# Patient Record
Sex: FEMALE | Race: BLACK OR AFRICAN AMERICAN | NOT HISPANIC OR LATINO | URBAN - METROPOLITAN AREA
[De-identification: names, ages, dates, MRNs, and addresses within clinical notes are randomized per-mention and may not be internally consistent; named-entity substitution may affect disease eponyms.]

---

## 2022-01-25 ENCOUNTER — EMERGENCY (EMERGENCY)
Facility: HOSPITAL | Age: 22
LOS: 1 days | Discharge: HOME | End: 2022-01-25
Admitting: STUDENT IN AN ORGANIZED HEALTH CARE EDUCATION/TRAINING PROGRAM
Payer: COMMERCIAL

## 2022-01-25 VITALS
OXYGEN SATURATION: 98 % | RESPIRATION RATE: 18 BRPM | TEMPERATURE: 97 F | SYSTOLIC BLOOD PRESSURE: 130 MMHG | HEART RATE: 80 BPM | DIASTOLIC BLOOD PRESSURE: 70 MMHG

## 2022-01-25 VITALS
SYSTOLIC BLOOD PRESSURE: 126 MMHG | RESPIRATION RATE: 18 BRPM | HEART RATE: 78 BPM | TEMPERATURE: 97 F | DIASTOLIC BLOOD PRESSURE: 74 MMHG | OXYGEN SATURATION: 100 %

## 2022-01-25 PROCEDURE — 99283 EMERGENCY DEPT VISIT LOW MDM: CPT

## 2022-01-25 RX ORDER — CYCLOBENZAPRINE HYDROCHLORIDE 10 MG/1
1 TABLET, FILM COATED ORAL
Qty: 10 | Refills: 0
Start: 2022-01-25 | End: 2022-01-29

## 2022-01-25 RX ORDER — IBUPROFEN 200 MG
600 TABLET ORAL ONCE
Refills: 0 | Status: COMPLETED | OUTPATIENT
Start: 2022-01-25 | End: 2022-01-25

## 2022-01-25 RX ORDER — LIDOCAINE 4 G/100G
1 CREAM TOPICAL ONCE
Refills: 0 | Status: COMPLETED | OUTPATIENT
Start: 2022-01-25 | End: 2022-01-25

## 2022-01-25 RX ORDER — IBUPROFEN 200 MG
1 TABLET ORAL
Qty: 15 | Refills: 0
Start: 2022-01-25 | End: 2022-01-29

## 2022-01-25 RX ORDER — DIAZEPAM 5 MG
5 TABLET ORAL ONCE
Refills: 0 | Status: DISCONTINUED | OUTPATIENT
Start: 2022-01-25 | End: 2022-01-25

## 2022-01-25 RX ADMIN — LIDOCAINE 1 PATCH: 4 CREAM TOPICAL at 20:27

## 2022-01-25 RX ADMIN — Medication 600 MILLIGRAM(S): at 20:41

## 2022-01-25 RX ADMIN — Medication 5 MILLIGRAM(S): at 20:27

## 2022-01-25 NOTE — ED ADULT TRIAGE NOTE - CHIEF COMPLAINT QUOTE
Pt c/o left side neck, left shoulder and upper back pain since yesterday. Pt denies injury or trauma, no PMH

## 2022-01-25 NOTE — ED PROVIDER NOTE - OBJECTIVE STATEMENT
22 y/o F p/w L sided neck pain x today. Pt states neck feels stiff, worse with movement or palpation. Pain radiates into L shoulder and mid back. Has not taken anything for pain. No chest pain or sob. No fever, chills.

## 2022-01-25 NOTE — ED ADULT NURSE NOTE - OBJECTIVE STATEMENT
Pt received to INT room 4. Pt A&OX3, ambulatory at baseline. Pt denies pmh. Pt c/o L sided neck pain, L sided shoulder pain, and L sided back pain 8/10, nonradiating. Pt denies any recent trauma, injuries, falls at this time. Pt endorses no recent physical activity that would have caused muscle strain. Pt resp even unlabored, abd soft nontender, pedal pulses 2+ bilaterally. Pt denies chest pain, SOB, n/v/d, headaches, dizziness, changes in vision. Medicated as per MD. Awaiting further orders.

## 2022-01-25 NOTE — ED PROVIDER NOTE - PATIENT PORTAL LINK FT
You can access the FollowMyHealth Patient Portal offered by [facility] by registering at the following website: [email or web address]. By joining [facility]’s FollowMyHealth portal, you will also be able to view your health information using other applications (apps) compatible with our system.

## 2022-01-25 NOTE — ED ADULT NURSE NOTE - NSIMPLEMENTINTERV_GEN_ALL_ED
Implemented All Fall with Harm Risk Interventions:  [place] to call system. Call bell, personal items and telephone within reach. Instruct patient to call for assistance. Room bathroom lighting operational. Non-slip footwear when patient is off stretcher. Physically safe environment: no spills, clutter or unnecessary equipment. Stretcher in lowest position, wheels locked, appropriate side rails in place. Provide visual cue, wrist band, yellow gown, etc. Monitor gait and stability. Monitor for mental status changes and reorient to person, place, and time. Review medications for side effects contributing to fall risk. Reinforce activity limits and safety measures with patient and family. Provide visual clues: red socks.

## 2022-12-18 ENCOUNTER — EMERGENCY (EMERGENCY)
Facility: HOSPITAL | Age: 22
LOS: 1 days | Discharge: HOME | End: 2022-12-18
Attending: STUDENT IN AN ORGANIZED HEALTH CARE EDUCATION/TRAINING PROGRAM | Admitting: STUDENT IN AN ORGANIZED HEALTH CARE EDUCATION/TRAINING PROGRAM

## 2022-12-18 VITALS
RESPIRATION RATE: 18 BRPM | DIASTOLIC BLOOD PRESSURE: 65 MMHG | SYSTOLIC BLOOD PRESSURE: 123 MMHG | OXYGEN SATURATION: 98 % | TEMPERATURE: 98 F | HEART RATE: 85 BPM

## 2022-12-18 PROCEDURE — 99284 EMERGENCY DEPT VISIT MOD MDM: CPT

## 2022-12-18 PROCEDURE — 93010 ELECTROCARDIOGRAM REPORT: CPT

## 2022-12-18 NOTE — ED PROVIDER NOTE - CLINICAL SUMMARY MEDICAL DECISION MAKING FREE TEXT BOX
Young F presenting after an episode of whole body shaking and chest pressure. Asymptomatic at this time. Afebrile, well appearing, physical exam benign. No neuro deficits. EKG non-ischemic. Will refer to PMD/psych. Not consistent with ACS.

## 2022-12-18 NOTE — ED PROVIDER NOTE - PATIENT PORTAL LINK FT
You can access the FollowMyHealth Patient Portal offered by [facility] by registering at the following website: [email or web address]. By joining [facility]’s FollowMyHealth portal, you will also be able to view your health information using other applications (apps) compatible with our system.

## 2022-12-18 NOTE — ED ADULT TRIAGE NOTE - CHIEF COMPLAINT QUOTE
p/t with hx anxiety c/o of generalized weakness on and off, occasional vomiting, chest discomfort since this am, p/t appears anxious, denies any chest pain denies sob

## 2022-12-18 NOTE — ED PROVIDER NOTE - OBJECTIVE STATEMENT
22F, no sig, pmh who presents with an episode of whole body shaking and chest pressure. Earlier this afternoon, had an episode of whole body shaking and chest pressure that self-resolved. Reports that she has a hx of these episodes. Now feels well. No headaches, fevers, chills, cough, sputum, belly pain, nvd, dysuria, hematuria, recent travel, trauma, syncope. Does report that she is feeling more anxious lately. Denies SI/HI. Does not see a therapist/psych. LMP 3 weeks prior.

## 2023-04-26 NOTE — ED ADULT TRIAGE NOTE - PRO INTERPRETER NEED 2

## 2024-03-24 ENCOUNTER — EMERGENCY (EMERGENCY)
Facility: HOSPITAL | Age: 24
LOS: 1 days | Discharge: HOME | End: 2024-03-24
Admitting: EMERGENCY MEDICINE
Payer: COMMERCIAL

## 2024-03-24 VITALS
RESPIRATION RATE: 16 BRPM | OXYGEN SATURATION: 99 % | DIASTOLIC BLOOD PRESSURE: 78 MMHG | SYSTOLIC BLOOD PRESSURE: 121 MMHG | HEART RATE: 61 BPM | TEMPERATURE: 98 F

## 2024-03-24 LAB
ALBUMIN SERPL ELPH-MCNC: 4.1 G/DL — SIGNIFICANT CHANGE UP (ref 3.3–5)
ALP SERPL-CCNC: 62 U/L — SIGNIFICANT CHANGE UP (ref 40–120)
ALT FLD-CCNC: 6 U/L — SIGNIFICANT CHANGE UP (ref 4–33)
ANION GAP SERPL CALC-SCNC: 12 MMOL/L — SIGNIFICANT CHANGE UP (ref 7–14)
APPEARANCE UR: CLEAR — SIGNIFICANT CHANGE UP
AST SERPL-CCNC: 14 U/L — SIGNIFICANT CHANGE UP (ref 4–32)
BACTERIA # UR AUTO: NEGATIVE /HPF — SIGNIFICANT CHANGE UP
BASOPHILS # BLD AUTO: 0.03 K/UL — SIGNIFICANT CHANGE UP (ref 0–0.2)
BASOPHILS NFR BLD AUTO: 0.5 % — SIGNIFICANT CHANGE UP (ref 0–2)
BILIRUB SERPL-MCNC: 0.2 MG/DL — SIGNIFICANT CHANGE UP (ref 0.2–1.2)
BILIRUB UR-MCNC: NEGATIVE — SIGNIFICANT CHANGE UP
BUN SERPL-MCNC: 9 MG/DL — SIGNIFICANT CHANGE UP (ref 7–23)
CALCIUM SERPL-MCNC: 9.8 MG/DL — SIGNIFICANT CHANGE UP (ref 8.4–10.5)
CAST: 0 /LPF — SIGNIFICANT CHANGE UP (ref 0–4)
CHLORIDE SERPL-SCNC: 100 MMOL/L — SIGNIFICANT CHANGE UP (ref 98–107)
CO2 SERPL-SCNC: 24 MMOL/L — SIGNIFICANT CHANGE UP (ref 22–31)
COLOR SPEC: YELLOW — SIGNIFICANT CHANGE UP
CREAT SERPL-MCNC: 0.77 MG/DL — SIGNIFICANT CHANGE UP (ref 0.5–1.3)
DIFF PNL FLD: ABNORMAL
EGFR: 112 ML/MIN/1.73M2 — SIGNIFICANT CHANGE UP
EOSINOPHIL # BLD AUTO: 0.01 K/UL — SIGNIFICANT CHANGE UP (ref 0–0.5)
EOSINOPHIL NFR BLD AUTO: 0.2 % — SIGNIFICANT CHANGE UP (ref 0–6)
GLUCOSE SERPL-MCNC: 99 MG/DL — SIGNIFICANT CHANGE UP (ref 70–99)
GLUCOSE UR QL: NEGATIVE MG/DL — SIGNIFICANT CHANGE UP
HCT VFR BLD CALC: 33.6 % — LOW (ref 34.5–45)
HGB BLD-MCNC: 10.9 G/DL — LOW (ref 11.5–15.5)
IANC: 4.47 K/UL — SIGNIFICANT CHANGE UP (ref 1.8–7.4)
IMM GRANULOCYTES NFR BLD AUTO: 0.2 % — SIGNIFICANT CHANGE UP (ref 0–0.9)
KETONES UR-MCNC: >=160 MG/DL
LEUKOCYTE ESTERASE UR-ACNC: NEGATIVE — SIGNIFICANT CHANGE UP
LIDOCAIN IGE QN: 15 U/L — SIGNIFICANT CHANGE UP (ref 7–60)
LYMPHOCYTES # BLD AUTO: 0.93 K/UL — LOW (ref 1–3.3)
LYMPHOCYTES # BLD AUTO: 15.8 % — SIGNIFICANT CHANGE UP (ref 13–44)
MCHC RBC-ENTMCNC: 25.1 PG — LOW (ref 27–34)
MCHC RBC-ENTMCNC: 32.4 GM/DL — SIGNIFICANT CHANGE UP (ref 32–36)
MCV RBC AUTO: 77.2 FL — LOW (ref 80–100)
MONOCYTES # BLD AUTO: 0.43 K/UL — SIGNIFICANT CHANGE UP (ref 0–0.9)
MONOCYTES NFR BLD AUTO: 7.3 % — SIGNIFICANT CHANGE UP (ref 2–14)
NEUTROPHILS # BLD AUTO: 4.47 K/UL — SIGNIFICANT CHANGE UP (ref 1.8–7.4)
NEUTROPHILS NFR BLD AUTO: 76 % — SIGNIFICANT CHANGE UP (ref 43–77)
NITRITE UR-MCNC: NEGATIVE — SIGNIFICANT CHANGE UP
NRBC # BLD: 0 /100 WBCS — SIGNIFICANT CHANGE UP (ref 0–0)
NRBC # FLD: 0 K/UL — SIGNIFICANT CHANGE UP (ref 0–0)
PH UR: 6 — SIGNIFICANT CHANGE UP (ref 5–8)
PLATELET # BLD AUTO: 394 K/UL — SIGNIFICANT CHANGE UP (ref 150–400)
POTASSIUM SERPL-MCNC: 3.9 MMOL/L — SIGNIFICANT CHANGE UP (ref 3.5–5.3)
POTASSIUM SERPL-SCNC: 3.9 MMOL/L — SIGNIFICANT CHANGE UP (ref 3.5–5.3)
PROT SERPL-MCNC: 8.4 G/DL — HIGH (ref 6–8.3)
PROT UR-MCNC: 30 MG/DL
RBC # BLD: 4.35 M/UL — SIGNIFICANT CHANGE UP (ref 3.8–5.2)
RBC # FLD: 13 % — SIGNIFICANT CHANGE UP (ref 10.3–14.5)
RBC CASTS # UR COMP ASSIST: 4 /HPF — SIGNIFICANT CHANGE UP (ref 0–4)
SODIUM SERPL-SCNC: 136 MMOL/L — SIGNIFICANT CHANGE UP (ref 135–145)
SP GR SPEC: 1.03 — HIGH (ref 1–1.03)
SQUAMOUS # UR AUTO: 1 /HPF — SIGNIFICANT CHANGE UP (ref 0–5)
UROBILINOGEN FLD QL: 1 MG/DL — SIGNIFICANT CHANGE UP (ref 0.2–1)
WBC # BLD: 5.88 K/UL — SIGNIFICANT CHANGE UP (ref 3.8–10.5)
WBC # FLD AUTO: 5.88 K/UL — SIGNIFICANT CHANGE UP (ref 3.8–10.5)
WBC UR QL: 1 /HPF — SIGNIFICANT CHANGE UP (ref 0–5)

## 2024-03-24 PROCEDURE — 99285 EMERGENCY DEPT VISIT HI MDM: CPT

## 2024-03-24 PROCEDURE — 74177 CT ABD & PELVIS W/CONTRAST: CPT | Mod: 26,MC

## 2024-03-24 RX ORDER — ONDANSETRON 8 MG/1
4 TABLET, FILM COATED ORAL ONCE
Refills: 0 | Status: COMPLETED | OUTPATIENT
Start: 2024-03-24 | End: 2024-03-24

## 2024-03-24 RX ORDER — FAMOTIDINE 10 MG/ML
20 INJECTION INTRAVENOUS ONCE
Refills: 0 | Status: COMPLETED | OUTPATIENT
Start: 2024-03-24 | End: 2024-03-24

## 2024-03-24 RX ORDER — SODIUM CHLORIDE 9 MG/ML
1000 INJECTION INTRAMUSCULAR; INTRAVENOUS; SUBCUTANEOUS ONCE
Refills: 0 | Status: COMPLETED | OUTPATIENT
Start: 2024-03-24 | End: 2024-03-24

## 2024-03-24 RX ADMIN — SODIUM CHLORIDE 1000 MILLILITER(S): 9 INJECTION INTRAMUSCULAR; INTRAVENOUS; SUBCUTANEOUS at 21:05

## 2024-03-24 RX ADMIN — ONDANSETRON 4 MILLIGRAM(S): 8 TABLET, FILM COATED ORAL at 19:25

## 2024-03-24 RX ADMIN — Medication 30 MILLILITER(S): at 19:25

## 2024-03-24 RX ADMIN — FAMOTIDINE 20 MILLIGRAM(S): 10 INJECTION INTRAVENOUS at 19:25

## 2024-03-24 NOTE — ED PROVIDER NOTE - PATIENT PORTAL LINK FT
You can access the FollowMyHealth Patient Portal offered by [facility] by registering at the following website: [email or web address]. By joining [employer]s FollowMyHealth portal, you will also be able to view your health information using other applications (apps) compatible with our system.

## 2024-03-24 NOTE — ED ADULT NURSE NOTE - OBJECTIVE STATEMENT
Break RN: Pt is a 24 y/o Female, A&Ox4, ambulatory with no reported PHx, presenting to the ED with c/o generalized abdominal pain, nausea and 2 episodes of vomiting today. Pt states she is on her menstrual cycle but denies any increased vaginal bleeding or clots. Pt describes abdominal pain as cramping. Respirations even and unlabored, chest rise equal b/l. Pt denies chest pain, SOB, fever, cough, chills, diarrhea, constipation, h/a, dizziness, numbness/tingling or any urinary symptoms at this time. No acute distress noted. 20g IVL placed in LAC. Labs collected and sent. Medication administered as ordered, see MAR. Safety maintained throughout.

## 2024-03-24 NOTE — ED PROVIDER NOTE - CLINICAL SUMMARY MEDICAL DECISION MAKING FREE TEXT BOX
pt with mild diffuse abdominal pain, nausea, vomiting; ddx includes viral gastritis, constipation, uti, pancreatitis, pregnancy  -will check cbc, cmp, lipase, ua/ucx, ucg, and give zofrna, pepcid, maalox for nausea and abdominal pain

## 2024-03-24 NOTE — ED PROVIDER NOTE - PROGRESS NOTE DETAILS
FLORY Javed: Received sign out on patient pending CT read.  CT impression: No bowel obstruction.    Nonspecific partially enhancing loculated free fluid in the pelvis.   Correlatefor prior PID, endometriosis, surgery, or episodes of bowel   inflammation to account for a peritoneal inclusion cyst.  Suggestion of polycystic appearance of the ovaries which can be confirmed   with ultrasound if clinically warranted.    Pt re-evaluated.  states has generalized abd pain, soft, ND on exam.  Discussed findings with patient, recommend to follow up with pcp and GYN.

## 2024-03-24 NOTE — ED PROVIDER NOTE - OBJECTIVE STATEMENT
24 y/o F no PMH c/o generalized abdominal pain since yesterday with nausea, belching, and 1 episode of vomiting today. Pt notes her menses started a few days ago and she usually has no appetite during this time. Denies fever, chills, recent sick contacts, recent travel, hematemesis, CP, SOB, cough, diarrhea, dysuria, frequency, abnormal vaginal discharge. LBM Thursday which pt states is normal for her.

## 2024-03-24 NOTE — ED PROVIDER NOTE - NSFOLLOWUPINSTRUCTIONS_ED_ALL_ED_FT
Take 3 advil every 8 hours as needed for pain.  Avoid any spicy, greasy or dairy foods.  Follow up with a GYN doctor within 1 week.  Follow up with your PCP within 1 week.  Return to ED for any worsening pain, vomiting, fever.

## 2024-03-25 ENCOUNTER — INPATIENT (INPATIENT)
Facility: HOSPITAL | Age: 24
LOS: 2 days | Discharge: HOME | End: 2024-03-28
Attending: OBSTETRICS & GYNECOLOGY | Admitting: OBSTETRICS & GYNECOLOGY
Payer: COMMERCIAL

## 2024-03-25 VITALS
DIASTOLIC BLOOD PRESSURE: 75 MMHG | OXYGEN SATURATION: 99 % | RESPIRATION RATE: 16 BRPM | SYSTOLIC BLOOD PRESSURE: 115 MMHG | HEART RATE: 62 BPM | TEMPERATURE: 98 F

## 2024-03-25 VITALS
OXYGEN SATURATION: 100 % | RESPIRATION RATE: 17 BRPM | HEART RATE: 65 BPM | DIASTOLIC BLOOD PRESSURE: 71 MMHG | SYSTOLIC BLOOD PRESSURE: 114 MMHG | TEMPERATURE: 98 F

## 2024-03-25 DIAGNOSIS — N70.93 SALPINGITIS AND OOPHORITIS, UNSPECIFIED: ICD-10-CM

## 2024-03-25 LAB
ALBUMIN SERPL ELPH-MCNC: 4.3 G/DL — SIGNIFICANT CHANGE UP (ref 3.3–5)
ALP SERPL-CCNC: 64 U/L — SIGNIFICANT CHANGE UP (ref 40–120)
ALT FLD-CCNC: 6 U/L — SIGNIFICANT CHANGE UP (ref 4–33)
ANION GAP SERPL CALC-SCNC: 10 MMOL/L — SIGNIFICANT CHANGE UP (ref 7–14)
APPEARANCE UR: CLEAR — SIGNIFICANT CHANGE UP
AST SERPL-CCNC: 13 U/L — SIGNIFICANT CHANGE UP (ref 4–32)
BASOPHILS # BLD AUTO: 0.04 K/UL — SIGNIFICANT CHANGE UP (ref 0–0.2)
BASOPHILS NFR BLD AUTO: 0.6 % — SIGNIFICANT CHANGE UP (ref 0–2)
BILIRUB SERPL-MCNC: 0.2 MG/DL — SIGNIFICANT CHANGE UP (ref 0.2–1.2)
BILIRUB UR-MCNC: NEGATIVE — SIGNIFICANT CHANGE UP
BUN SERPL-MCNC: 9 MG/DL — SIGNIFICANT CHANGE UP (ref 7–23)
CALCIUM SERPL-MCNC: 10 MG/DL — SIGNIFICANT CHANGE UP (ref 8.4–10.5)
CHLORIDE SERPL-SCNC: 101 MMOL/L — SIGNIFICANT CHANGE UP (ref 98–107)
CO2 SERPL-SCNC: 27 MMOL/L — SIGNIFICANT CHANGE UP (ref 22–31)
COLOR SPEC: YELLOW — SIGNIFICANT CHANGE UP
CREAT SERPL-MCNC: 0.9 MG/DL — SIGNIFICANT CHANGE UP (ref 0.5–1.3)
CULTURE RESULTS: SIGNIFICANT CHANGE UP
DIFF PNL FLD: ABNORMAL
EGFR: 93 ML/MIN/1.73M2 — SIGNIFICANT CHANGE UP
EOSINOPHIL # BLD AUTO: 0.01 K/UL — SIGNIFICANT CHANGE UP (ref 0–0.5)
EOSINOPHIL NFR BLD AUTO: 0.2 % — SIGNIFICANT CHANGE UP (ref 0–6)
GLUCOSE SERPL-MCNC: 111 MG/DL — HIGH (ref 70–99)
GLUCOSE UR QL: NEGATIVE MG/DL — SIGNIFICANT CHANGE UP
HCT VFR BLD CALC: 33.4 % — LOW (ref 34.5–45)
HGB BLD-MCNC: 10.8 G/DL — LOW (ref 11.5–15.5)
HIV 1+2 AB+HIV1 P24 AG SERPL QL IA: SIGNIFICANT CHANGE UP
IANC: 4.8 K/UL — SIGNIFICANT CHANGE UP (ref 1.8–7.4)
IMM GRANULOCYTES NFR BLD AUTO: 0.2 % — SIGNIFICANT CHANGE UP (ref 0–0.9)
KETONES UR-MCNC: NEGATIVE MG/DL — SIGNIFICANT CHANGE UP
LEUKOCYTE ESTERASE UR-ACNC: NEGATIVE — SIGNIFICANT CHANGE UP
LIDOCAIN IGE QN: 20 U/L — SIGNIFICANT CHANGE UP (ref 7–60)
LYMPHOCYTES # BLD AUTO: 1.1 K/UL — SIGNIFICANT CHANGE UP (ref 1–3.3)
LYMPHOCYTES # BLD AUTO: 16.8 % — SIGNIFICANT CHANGE UP (ref 13–44)
MCHC RBC-ENTMCNC: 24.9 PG — LOW (ref 27–34)
MCHC RBC-ENTMCNC: 32.3 GM/DL — SIGNIFICANT CHANGE UP (ref 32–36)
MCV RBC AUTO: 77 FL — LOW (ref 80–100)
MONOCYTES # BLD AUTO: 0.57 K/UL — SIGNIFICANT CHANGE UP (ref 0–0.9)
MONOCYTES NFR BLD AUTO: 8.7 % — SIGNIFICANT CHANGE UP (ref 2–14)
NEUTROPHILS # BLD AUTO: 4.8 K/UL — SIGNIFICANT CHANGE UP (ref 1.8–7.4)
NEUTROPHILS NFR BLD AUTO: 73.5 % — SIGNIFICANT CHANGE UP (ref 43–77)
NITRITE UR-MCNC: NEGATIVE — SIGNIFICANT CHANGE UP
NRBC # BLD: 0 /100 WBCS — SIGNIFICANT CHANGE UP (ref 0–0)
NRBC # FLD: 0 K/UL — SIGNIFICANT CHANGE UP (ref 0–0)
PH UR: 7 — SIGNIFICANT CHANGE UP (ref 5–8)
PLATELET # BLD AUTO: 423 K/UL — HIGH (ref 150–400)
POTASSIUM SERPL-MCNC: 4 MMOL/L — SIGNIFICANT CHANGE UP (ref 3.5–5.3)
POTASSIUM SERPL-SCNC: 4 MMOL/L — SIGNIFICANT CHANGE UP (ref 3.5–5.3)
PROT SERPL-MCNC: 8.5 G/DL — HIGH (ref 6–8.3)
PROT UR-MCNC: NEGATIVE MG/DL — SIGNIFICANT CHANGE UP
RBC # BLD: 4.34 M/UL — SIGNIFICANT CHANGE UP (ref 3.8–5.2)
RBC # FLD: 12.9 % — SIGNIFICANT CHANGE UP (ref 10.3–14.5)
SODIUM SERPL-SCNC: 138 MMOL/L — SIGNIFICANT CHANGE UP (ref 135–145)
SP GR SPEC: 1.01 — SIGNIFICANT CHANGE UP (ref 1–1.03)
SPECIMEN SOURCE: SIGNIFICANT CHANGE UP
UROBILINOGEN FLD QL: 0.2 MG/DL — SIGNIFICANT CHANGE UP (ref 0.2–1)
WBC # BLD: 6.53 K/UL — SIGNIFICANT CHANGE UP (ref 3.8–10.5)
WBC # FLD AUTO: 6.53 K/UL — SIGNIFICANT CHANGE UP (ref 3.8–10.5)

## 2024-03-25 PROCEDURE — 76830 TRANSVAGINAL US NON-OB: CPT | Mod: 26

## 2024-03-25 PROCEDURE — 99285 EMERGENCY DEPT VISIT HI MDM: CPT

## 2024-03-25 RX ORDER — KETOROLAC TROMETHAMINE 30 MG/ML
30 SYRINGE (ML) INJECTION ONCE
Refills: 0 | Status: DISCONTINUED | OUTPATIENT
Start: 2024-03-25 | End: 2024-03-25

## 2024-03-25 RX ORDER — KETOROLAC TROMETHAMINE 30 MG/ML
15 SYRINGE (ML) INJECTION ONCE
Refills: 0 | Status: DISCONTINUED | OUTPATIENT
Start: 2024-03-25 | End: 2024-03-25

## 2024-03-25 RX ORDER — ACETAMINOPHEN 500 MG
1000 TABLET ORAL ONCE
Refills: 0 | Status: COMPLETED | OUTPATIENT
Start: 2024-03-25 | End: 2024-03-25

## 2024-03-25 RX ORDER — IBUPROFEN 200 MG
600 TABLET ORAL EVERY 6 HOURS
Refills: 0 | Status: DISCONTINUED | OUTPATIENT
Start: 2024-03-25 | End: 2024-03-28

## 2024-03-25 RX ORDER — METRONIDAZOLE 500 MG
500 TABLET ORAL ONCE
Refills: 0 | Status: COMPLETED | OUTPATIENT
Start: 2024-03-25 | End: 2024-03-25

## 2024-03-25 RX ORDER — METRONIDAZOLE 500 MG
500 TABLET ORAL EVERY 12 HOURS
Refills: 0 | Status: DISCONTINUED | OUTPATIENT
Start: 2024-03-25 | End: 2024-03-26

## 2024-03-25 RX ORDER — ONDANSETRON 8 MG/1
4 TABLET, FILM COATED ORAL EVERY 6 HOURS
Refills: 0 | Status: DISCONTINUED | OUTPATIENT
Start: 2024-03-25 | End: 2024-03-28

## 2024-03-25 RX ORDER — CEFTRIAXONE 500 MG/1
1000 INJECTION, POWDER, FOR SOLUTION INTRAMUSCULAR; INTRAVENOUS EVERY 24 HOURS
Refills: 0 | Status: DISCONTINUED | OUTPATIENT
Start: 2024-03-26 | End: 2024-03-28

## 2024-03-25 RX ORDER — SIMETHICONE 80 MG/1
80 TABLET, CHEWABLE ORAL EVERY 6 HOURS
Refills: 0 | Status: DISCONTINUED | OUTPATIENT
Start: 2024-03-25 | End: 2024-03-28

## 2024-03-25 RX ORDER — FAMOTIDINE 10 MG/ML
20 INJECTION INTRAVENOUS
Refills: 0 | Status: DISCONTINUED | OUTPATIENT
Start: 2024-03-25 | End: 2024-03-28

## 2024-03-25 RX ORDER — SENNA PLUS 8.6 MG/1
2 TABLET ORAL AT BEDTIME
Refills: 0 | Status: DISCONTINUED | OUTPATIENT
Start: 2024-03-25 | End: 2024-03-28

## 2024-03-25 RX ORDER — CEFTRIAXONE 500 MG/1
1000 INJECTION, POWDER, FOR SOLUTION INTRAMUSCULAR; INTRAVENOUS ONCE
Refills: 0 | Status: COMPLETED | OUTPATIENT
Start: 2024-03-25 | End: 2024-03-25

## 2024-03-25 RX ORDER — ACETAMINOPHEN 500 MG
975 TABLET ORAL EVERY 6 HOURS
Refills: 0 | Status: DISCONTINUED | OUTPATIENT
Start: 2024-03-25 | End: 2024-03-28

## 2024-03-25 RX ORDER — CEFTRIAXONE 500 MG/1
1000 INJECTION, POWDER, FOR SOLUTION INTRAMUSCULAR; INTRAVENOUS EVERY 24 HOURS
Refills: 0 | Status: DISCONTINUED | OUTPATIENT
Start: 2024-03-25 | End: 2024-03-25

## 2024-03-25 RX ORDER — HEPARIN SODIUM 5000 [USP'U]/ML
5000 INJECTION INTRAVENOUS; SUBCUTANEOUS EVERY 12 HOURS
Refills: 0 | Status: DISCONTINUED | OUTPATIENT
Start: 2024-03-25 | End: 2024-03-28

## 2024-03-25 RX ADMIN — FAMOTIDINE 20 MILLIGRAM(S): 10 INJECTION INTRAVENOUS at 18:49

## 2024-03-25 RX ADMIN — Medication 15 MILLIGRAM(S): at 16:08

## 2024-03-25 RX ADMIN — Medication 400 MILLIGRAM(S): at 12:31

## 2024-03-25 RX ADMIN — Medication 975 MILLIGRAM(S): at 18:50

## 2024-03-25 RX ADMIN — Medication 100 MILLIGRAM(S): at 16:50

## 2024-03-25 RX ADMIN — SENNA PLUS 2 TABLET(S): 8.6 TABLET ORAL at 23:00

## 2024-03-25 RX ADMIN — CEFTRIAXONE 100 MILLIGRAM(S): 500 INJECTION, POWDER, FOR SOLUTION INTRAMUSCULAR; INTRAVENOUS at 16:14

## 2024-03-25 RX ADMIN — Medication 1000 MILLIGRAM(S): at 13:01

## 2024-03-25 RX ADMIN — Medication 600 MILLIGRAM(S): at 23:53

## 2024-03-25 RX ADMIN — Medication 100 MILLIGRAM(S): at 17:50

## 2024-03-25 RX ADMIN — Medication 30 MILLIGRAM(S): at 00:34

## 2024-03-25 RX ADMIN — HEPARIN SODIUM 5000 UNIT(S): 5000 INJECTION INTRAVENOUS; SUBCUTANEOUS at 18:53

## 2024-03-25 RX ADMIN — SIMETHICONE 80 MILLIGRAM(S): 80 TABLET, CHEWABLE ORAL at 18:50

## 2024-03-25 RX ADMIN — SIMETHICONE 80 MILLIGRAM(S): 80 TABLET, CHEWABLE ORAL at 23:53

## 2024-03-25 RX ADMIN — Medication 600 MILLIGRAM(S): at 18:50

## 2024-03-25 RX ADMIN — Medication 975 MILLIGRAM(S): at 23:53

## 2024-03-25 NOTE — ED ADULT NURSE NOTE - NSFALLUNIVINTERV_ED_ALL_ED
Bed/Stretcher in lowest position, wheels locked, appropriate side rails in place/Call bell, personal items and telephone in reach/Instruct patient to call for assistance before getting out of bed/chair/stretcher/Non-slip footwear applied when patient is off stretcher/[place] to call system/Physically safe environment - no spills, clutter or unnecessary equipment/Purposeful proactive rounding/Room/bathroom lighting operational, light cord in reach

## 2024-03-25 NOTE — ED PROVIDER NOTE - CLINICAL SUMMARY MEDICAL DECISION MAKING FREE TEXT BOX
23-year-old female with no stated past medical history presenting to the ER with abdominal pain.  Patient was seen in ED yesterday with similar complaints, had CT scan performed which did show "nonspecific partially enhanced loculating free fluid in the pelvis".  Patient reports her pain began 2 days ago, has had 3 episodes of vomiting as well. 23-year-old female with no stated past medical history presenting to the ER with abdominal pain.  Patient was seen in ED yesterday with similar complaints, had CT scan performed which did show "nonspecific partially enhanced loculating free fluid in the pelvis".  Patient reports her pain began 2 days ago, has had 3 episodes of vomiting as well. On exam pt is well appearing, afebrile, vitals within normal, minimal left sided abdominal tenderness, no adnexal tenderness or CMT. Given reports of yesterdays CT will get TVUS to evaluate pelvic fluid collection. Plan: cbc/cmp, ucg, STD testing, TVUS, pain control. Vijaya att: 23-year-old female with no stated past medical history presenting to the ER with abdominal pain.  Patient was seen in ED yesterday with similar complaints, had CT scan performed which did show "nonspecific partially enhanced loculating free fluid in the pelvis".  Patient reports her pain began 2 days ago, has had 3 episodes of vomiting as well. On exam pt is well appearing, afebrile, vitals within normal, minimal left sided abdominal tenderness, no adnexal tenderness or CMT. Given reports of yesterdays CT will get TVUS to evaluate pelvic fluid collection. Plan: cbc/cmp, ucg, STD testing, TVUS, pain control.

## 2024-03-25 NOTE — H&P ADULT - NSHPLABSRESULTS_GEN_ALL_CORE
10.8   6.53  )-----------( [phone]      ( 25 Mar 2024 12:32 )             33.4       < from: US Transvaginal (03.25.24 @ 13:51) >      ACC: [id number] EXAM:  US TRANSVAGINAL   ORDERED BY: ROSAMARIA LANG     PROCEDURE DATE:  03/25/2024          INTERPRETATION:  CLINICAL INFORMATION: Lower abdominal pain. CT scan   demonstrated a loculated pelvic fluid. Evaluate for ovarian cyst.    LMP: 3/21/2024    COMPARISON: None available.    TECHNIQUE:  Endovaginal pelvic sonogram only.    FINDINGS:  Uterus: 6.2 cm x 2.9 cm x 3.8 cm. Within normal limits.  Endometrium: 3 mm. Within normal limits.    Right ovary: 3.5 cm x 1.6 cm x 2.1 cm. Within normal limits. Small   follicles. Normal arterial and venous waveforms.  Left ovary: 4.0 cm x 1.8 cm x 2.1 cm. Within normal limits. Small   follicles. Normal arterial and venous waveforms.    Tubular structures in the bilateral adnexa, more prominent on the right   than the left with surrounding increased vascularity on color Doppler   images, with a suggestion of intraluminal debris.    Fluid: Fluid collection noted on prior CT scan is not identified on the   current sonographic exam.    IMPRESSION:  Bilateral prominent fallopian tubes with intraluminal debris and   surrounding increased vascularity. Findings suggest pyosalpinx. Correlate   with clinical and laboratory findings.        --- End of Report ---        ROLAND GREY MD; Attending Radiologist  This document has been electronically signed. Mar 25 2024  2:36PM    < end of copied text >
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
## 2024-03-25 NOTE — H&P ADULT - HISTORY OF PRESENT ILLNESS
DARY GABRIEL  23y  Female [id number]    HPI: 22yo G0, LMP 3/21/2024, who presents to the ED for persistent periumbilical/lower abdominal pain that started this past Saturday. She has tried Acetaminophen without any improvement in the pain. Patient additionally endorses lower back pain. States that she additionally has had intermittent n/v. Otherwise, passing gas and having BMs. Of note, patient had presented to the ED with similar complaints yesterday. Denies any fevers, chills, chest pain, shortness of breath, dysuria, abnormal vaginal bleeding, or vaginal discharge     Patient is sexually active and reports having had x3 male partners within the last year. Reports using condoms regularly and states she was last tested earlier this year for STDs    Name of Ob/Gyn Physician: Has never seen one     POB: G0   PGyn: Denies  - Sexually active as above   MedHx: "Low iron"  SurgHx: Denies   Meds: Does not take any  Allergies: NKDA    Vital Signs Last 24 Hrs  T(C): 36.5 (25 Mar 2024 11:25), Max: 37 (24 Mar 2024 20:46)  T(F): 97.7 (25 Mar 2024 11:25), Max: 98.6 (24 Mar 2024 20:46)  HR: 65 (25 Mar 2024 11:25) (60 - 65)  BP: 114/71 (25 Mar 2024 11:25) (114/71 - 121/78)  BP(mean): --  RR: 17 (25 Mar 2024 11:25) (16 - 17)  SpO2: 100% (25 Mar 2024 11:25) (99% - 100%)    Parameters below as of 25 Mar 2024 11:25  Patient On (Oxygen Delivery Method): room air

## 2024-03-25 NOTE — CONSULT NOTE ADULT - SUBJECTIVE AND OBJECTIVE BOX
DARY GABRIEL  23y  Female [id number]    HPI: 22yo G0, LMP 3/21/2024, who presents to the ED for persistent periumbilical/lower abdominal pain that started this past Saturday. She has tried Acetaminophen without any improvement in the pain. Patient additionally endorses lower back pain. States that she additionally has had intermittent n/v. Otherwise, passing gas and having BMs. Of note, patient had presented to the ED with similar complaints yesterday. Denies any fevers, chills, chest pain, shortness of breath, dysuria, abnormal vaginal bleeding, or vaginal discharge     Patient is sexually active and reports having had x3 male partners within the last year. Reports using condoms regularly and states she was last tested earlier this year for STDs    Name of Ob/Gyn Physician: Has never seen one     POB: G0   PGyn: Denies  - Sexually active as above   MedHx: "Low iron"  SurgHx: Denies   Meds: Does not take any  Allergies: NKDA    Vital Signs Last 24 Hrs  T(C): 36.5 (25 Mar 2024 11:25), Max: 37 (24 Mar 2024 20:46)  T(F): 97.7 (25 Mar 2024 11:25), Max: 98.6 (24 Mar 2024 20:46)  HR: 65 (25 Mar 2024 11:25) (60 - 65)  BP: 114/71 (25 Mar 2024 11:25) (114/71 - 121/78)  BP(mean): --  RR: 17 (25 Mar 2024 11:25) (16 - 17)  SpO2: 100% (25 Mar 2024 11:25) (99% - 100%)    Parameters below as of 25 Mar 2024 11:25  Patient On (Oxygen Delivery Method): room air        Physical Exam:   General: Awake. Alert. No acute distress   Lungs: Unlabored breathing. No respiratory distress   Abd: Soft. Non-distended. Mild lower abdominal tenderness    (w/ Chaperone FLORY Varela):  No bleeding on pad. External vulva and labia w/o lesions or skin changes seen.  Bimanual exam with no cervical motion tenderness, uterus small and non-tender, and no adnexal tenderness b/l. Speculum exam with no cervical lesions and no mucopurulent discharge   Ext: No calf tenderness bilaterally    LABS:                              10.8   6.53  )-----------( 423      ( 25 Mar 2024 12:32 )             33.4     03-25    138  |  101  |  9   ----------------------------<  111<H>  4.0   |  27  |  0.90    Ca    10.0      25 Mar 2024 12:32    TPro  8.5<H>  /  Alb  4.3  /  TBili  0.2  /  DBili  x   /  AST  13  /  ALT  6   /  AlkPhos  64  03-25    I&O's Detail      Urinalysis Basic - ( 25 Mar 2024 12:32 )    Color: x / Appearance: x / SG: x / pH: x  Gluc: 111 mg/dL / Ketone: x  / Bili: x / Urobili: x   Blood: x / Protein: x / Nitrite: x   Leuk Esterase: x / RBC: x / WBC x   Sq Epi: x / Non Sq Epi: x / Bacteria: x        RADIOLOGY & ADDITIONAL STUDIES:    < from: US Transvaginal (03.25.24 @ 13:51) >    ACC: [id number] EXAM:  US TRANSVAGINAL   ORDERED BY: ROSAMARIA LANG     PROCEDURE DATE:  03/25/2024          INTERPRETATION:  CLINICAL INFORMATION: Lower abdominal pain. CT scan   demonstrated a loculated pelvic fluid. Evaluate for ovarian cyst.    LMP: 3/21/2024    COMPARISON: None available.    TECHNIQUE:  Endovaginal pelvic sonogram only.    FINDINGS:  Uterus: 6.2 cm x 2.9 cm x 3.8 cm. Within normal limits.  Endometrium: 3 mm. Within normal limits.    Right ovary: 3.5 cm x 1.6 cm x 2.1 cm. Within normal limits. Small   follicles. Normal arterial and venous waveforms.  Left ovary: 4.0 cm x 1.8 cm x 2.1 cm. Within normal limits. Small   follicles. Normal arterial and venous waveforms.    Tubular structures in the bilateral adnexa, more prominent on the right   than the left with surrounding increased vascularity on color Doppler   images, with a suggestion of intraluminal debris.    Fluid: Fluid collection noted on prior CT scan is not identified on the   current sonographic exam.    IMPRESSION:  Bilateral prominent fallopian tubes with intraluminal debris and   surrounding increased vascularity. Findings suggest pyosalpinx. Correlate   with clinical and laboratory findings.        --- End of Report ---            ROLAND GREY MD; Attending Radiologist  This document has been electronically signed. Mar 25 2024  2:36PM    < end of copied text >  < from: CT Abdomen and Pelvis w/ IV Cont (03.24.24 @ 22:07) >    ACC: [id number] EXAM:  CT ABDOMEN AND PELVIS IC   ORDERED BY: JOYCE OVIEDOLORI     PROCEDURE DATE:  03/24/2024          INTERPRETATION:  CLINICAL INFORMATION: Generalized abdominal pain,   nausea, vomiting, constipation, question small bowel obstruction.    COMPARISON: None.    CONTRAST/COMPLICATIONS:  IV Contrast: Omnipaque 350  90 cc administered   10 cc discarded  Oral Contrast: NONE  Complications: None reported at time of study completion    PROCEDURE:  CT of the Abdomen and Pelvis was performed.  Sagittal and coronal reformats were performed.    FINDINGS:  LOWER CHEST: Within normal limits.    LIVER: Within normal limits.  BILE DUCTS: Normal caliber.  GALLBLADDER: Within normal limits.  SPLEEN: Within normal limits.  PANCREAS: Within normal limits.  ADRENALS: Within normal limits.  KIDNEYS/URETERS: Kidneys enhance symmetrically. No hydronephrosis. No   obstructing nephrolithiasis.    BLADDER: Within normal limits.  REPRODUCTIVE ORGANS: Uterus is within normal limits. Symmetric bilateral   adnexa with appearance of multiple small ovarian cyst.    BOWEL: No bowel obstruction. Appendix is normal.  PERITONEUM: Small volume of loculated free fluid in the cul-de-sac,   partially enhancing measuring 3.4 x 3.0 cm.  VESSELS: Within normal limits.  RETROPERITONEUM/LYMPH NODES: No lymphadenopathy.  ABDOMINAL WALL: Within normal limits.  BONES: Within normal limits.    IMPRESSION:  No bowel obstruction.    Nonspecific partially enhancing loculated free fluid in the pelvis.   Correlatefor prior PID, endometriosis, surgery, or episodes of bowel   inflammation to account for a peritoneal inclusion cyst.    Suggestion of polycystic appearance of the ovaries which can be confirmed   with ultrasound if clinically warranted.        --- End of Report ---          MONSE DOTY MD; Resident Radiologist  This document has been electronically signed.  DONAL RODRIGUEZ MD; Attending Radiologist  This document has been electronically signed. Mar 24 2024 11:46PM    < end of copied text >   DARY GABRIEL  23y  Female [id number]    HPI: 24yo G0, LMP 3/21/2024, who presents to the ED for persistent periumbilical/lower abdominal pain that started this past Saturday. She has tried Acetaminophen without any improvement in the pain. Patient additionally endorses lower back pain. States that she additionally has had intermittent n/v. Otherwise, passing gas and having BMs. Of note, patient had presented to the ED with similar complaints yesterday. Denies any fevers, chills, chest pain, shortness of breath, dysuria, abnormal vaginal bleeding, or vaginal discharge     Patient is sexually active and reports having had x3 male partners within the last year. Reports using condoms regularly and states she was last tested earlier this year for STDs    Name of Ob/Gyn Physician: Has never seen one     POB: G0   PGyn: Denies  - Sexually active as above   MedHx: "Low iron"  SurgHx: Denies   Meds: Does not take any  Allergies: NKDA    Vital Signs Last 24 Hrs  T(C): 36.5 (25 Mar 2024 11:25), Max: 37 (24 Mar 2024 20:46)  T(F): 97.7 (25 Mar 2024 11:25), Max: 98.6 (24 Mar 2024 20:46)  HR: 65 (25 Mar 2024 11:25) (60 - 65)  BP: 114/71 (25 Mar 2024 11:25) (114/71 - 121/78)  BP(mean): --  RR: 17 (25 Mar 2024 11:25) (16 - 17)  SpO2: 100% (25 Mar 2024 11:25) (99% - 100%)    Parameters below as of 25 Mar 2024 11:25  Patient On (Oxygen Delivery Method): room air        Physical Exam:   General: Awake. Alert. No acute distress   Lungs: Unlabored breathing. No respiratory distress   Abd: Soft. Non-distended. Mild lower abdominal tenderness    (w/ Chaperone FLORY Varela):  No bleeding on pad. External vulva and labia w/o lesions or skin changes seen.  Bimanual exam with no cervical motion tenderness, uterus small and non-tender, and no adnexal tenderness b/l. Speculum exam with no cervical lesions and no mucopurulent discharge   Ext: No calf tenderness bilaterally      LABS:                        10.8   6.53  )-----------( 423      ( 25 Mar 2024 12:32 )             33.4     03-25    138  |  101  |  9   ----------------------------<  111<H>  4.0   |  27  |  0.90    Ca    10.0      25 Mar 2024 12:32    TPro  8.5<H>  /  Alb  4.3  /  TBili  0.2  /  DBili  x   /  AST  13  /  ALT  6   /  AlkPhos  64  03-25    I&O's Detail      Urinalysis Basic - ( 25 Mar 2024 12:32 )    Color: x / Appearance: x / SG: x / pH: x  Gluc: 111 mg/dL / Ketone: x  / Bili: x / Urobili: x   Blood: x / Protein: x / Nitrite: x   Leuk Esterase: x / RBC: x / WBC x   Sq Epi: x / Non Sq Epi: x / Bacteria: x        RADIOLOGY & ADDITIONAL STUDIES:    < from: US Transvaginal (03.25.24 @ 13:51) >    ACC: [id number] EXAM:  US TRANSVAGINAL   ORDERED BY: ROSAMARIA LANG     PROCEDURE DATE:  03/25/2024          INTERPRETATION:  CLINICAL INFORMATION: Lower abdominal pain. CT scan   demonstrated a loculated pelvic fluid. Evaluate for ovarian cyst.    LMP: 3/21/2024    COMPARISON: None available.    TECHNIQUE:  Endovaginal pelvic sonogram only.    FINDINGS:  Uterus: 6.2 cm x 2.9 cm x 3.8 cm. Within normal limits.  Endometrium: 3 mm. Within normal limits.    Right ovary: 3.5 cm x 1.6 cm x 2.1 cm. Within normal limits. Small   follicles. Normal arterial and venous waveforms.  Left ovary: 4.0 cm x 1.8 cm x 2.1 cm. Within normal limits. Small   follicles. Normal arterial and venous waveforms.    Tubular structures in the bilateral adnexa, more prominent on the right   than the left with surrounding increased vascularity on color Doppler   images, with a suggestion of intraluminal debris.    Fluid: Fluid collection noted on prior CT scan is not identified on the   current sonographic exam.    IMPRESSION:  Bilateral prominent fallopian tubes with intraluminal debris and   surrounding increased vascularity. Findings suggest pyosalpinx. Correlate   with clinical and laboratory findings.        --- End of Report ---            ROLAND GREY MD; Attending Radiologist  This document has been electronically signed. Mar 25 2024  2:36PM    < end of copied text >  < from: CT Abdomen and Pelvis w/ IV Cont (03.24.24 @ 22:07) >    ACC: [id number] EXAM:  CT ABDOMEN AND PELVIS IC   ORDERED BY: JOYCE OVIEDOLORI     PROCEDURE DATE:  03/24/2024          INTERPRETATION:  CLINICAL INFORMATION: Generalized abdominal pain,   nausea, vomiting, constipation, question small bowel obstruction.    COMPARISON: None.    CONTRAST/COMPLICATIONS:  IV Contrast: Omnipaque 350  90 cc administered   10 cc discarded  Oral Contrast: NONE  Complications: None reported at time of study completion    PROCEDURE:  CT of the Abdomen and Pelvis was performed.  Sagittal and coronal reformats were performed.    FINDINGS:  LOWER CHEST: Within normal limits.    LIVER: Within normal limits.  BILE DUCTS: Normal caliber.  GALLBLADDER: Within normal limits.  SPLEEN: Within normal limits.  PANCREAS: Within normal limits.  ADRENALS: Within normal limits.  KIDNEYS/URETERS: Kidneys enhance symmetrically. No hydronephrosis. No   obstructing nephrolithiasis.    BLADDER: Within normal limits.  REPRODUCTIVE ORGANS: Uterus is within normal limits. Symmetric bilateral   adnexa with appearance of multiple small ovarian cyst.    BOWEL: No bowel obstruction. Appendix is normal.  PERITONEUM: Small volume of loculated free fluid in the cul-de-sac,   partially enhancing measuring 3.4 x 3.0 cm.  VESSELS: Within normal limits.  RETROPERITONEUM/LYMPH NODES: No lymphadenopathy.  ABDOMINAL WALL: Within normal limits.  BONES: Within normal limits.    IMPRESSION:  No bowel obstruction.    Nonspecific partially enhancing loculated free fluid in the pelvis.   Correlatefor prior PID, endometriosis, surgery, or episodes of bowel   inflammation to account for a peritoneal inclusion cyst.    Suggestion of polycystic appearance of the ovaries which can be confirmed   with ultrasound if clinically warranted.        --- End of Report ---          MONSE DOTY MD; Resident Radiologist  This document has been electronically signed.  DONAL RODRIGUEZ MD; Attending Radiologist  This document has been electronically signed. Mar 24 2024 11:46PM    < end of copied text >

## 2024-03-25 NOTE — ED ADULT NURSE NOTE - OBJECTIVE STATEMENT
Patient is a 24 yo female, phx anemia, presenting with abdominal pain x 2 days. A&Ox4, no signs of distress, reports nausea/vomiting/constipation today. Denies fever, urinary symptoms. LMP started 4 days ago. Abdomen soft, tender, nondistended. 20G PIV placed to RAC, labs sent per orders. Medicated for pain per orders. Pending TVUS. Fall precautions maintained.

## 2024-03-25 NOTE — ED ADULT NURSE NOTE - CADM POA CENTRAL LINE
Petechiae on legs, arms, trunk. Pt states that it initially started on his feet 2 weeks ago. Pt has seen a doctor in clinic for this about 1 week ago. Pt was started on prednisone. Is not getting any better. ABC Intact.    No

## 2024-03-25 NOTE — CONSULT NOTE ADULT - ASSESSMENT
24yo G0, LMP 3/21/2024, who presents to the ED for persistent periumbilical/lower abdominal pain that started this past Saturday. TVUS today with finding concerning for bilateral pyosalpinx. Nevertheless, patient is without any fevers or leukocytosis.  24yo G0, LMP 3/21/2024, who presents to the ED for persistent periumbilical/lower abdominal pain that started this past Saturday. TVUS today with finding concerning for bilateral pyosalpinx. Nevertheless, patient is without any fevers or leukocytosis. Physical exam notable for generalized lower abdominal tenderness, no CMT or abnormal vaginal discharge noted. Given patient with persistent lower abdominal pain associated with nausea vomiting and TVUS findings concerning for bilateral pyosalpinx, will admit to GYN for IV antibiotic treatment.    - Admit patient to GYN (Dr. Lama)  - Ceftriaxone, Doxy, Flagyl IV   - AM CBC, monitor leukocytosis  - f/u gonorrhea/chlamydia swab  - f/u urine culture     FLORY Carr  Seen w/ Dr. Calderon (PGY2)  d/w Dr. Scott

## 2024-03-25 NOTE — H&P ADULT - NSHPPHYSICALEXAM_GEN_ALL_CORE
Physical Exam:   General: Awake. Alert. No acute distress   Lungs: Unlabored breathing. No respiratory distress   Abd: Soft. Non-distended. Mild lower abdominal tenderness    (w/ Chaperone FLORY Varela):  No bleeding on pad. External vulva and labia w/o lesions or skin changes seen.  Bimanual exam with no cervical motion tenderness, uterus small and non-tender, and no adnexal tenderness b/l. Speculum exam with no cervical lesions and no mucopurulent discharge   Ext: No calf tenderness bilaterally

## 2024-03-25 NOTE — H&P ADULT - ASSESSMENT
22yo G0, LMP 3/21/2024, who presents to the ED for persistent periumbilical/lower abdominal pain that started this past Saturday. TVUS today with finding concerning for bilateral pyosalpinx. Nevertheless, patient is without any fevers or leukocytosis. Physical exam notable for generalized lower abdominal tenderness, no CMT or abnormal vaginal discharge noted. Given patient with persistent lower abdominal pain associated with nausea vomiting and TVUS findings concerning for bilateral pyosalpinx, will admit to GYN for IV antibiotic treatment.    - Admit patient to GYN (Dr. Lama)  - Ceftriaxone, Doxy, Flagyl IV   - AM CBC, monitor leukocytosis  - f/u gonorrhea/chlamydia swab  - f/u urine culture     FLORY Carr  Seen w/ Dr. Calderon (PGY2)  d/w Dr. Scott  22yo G0, LMP 3/21/2024, who presents to the ED for persistent periumbilical/lower abdominal pain that started this past Saturday. TVUS today with finding concerning for bilateral pyosalpinx. Nevertheless, patient is without any fevers or leukocytosis. Physical exam notable for generalized lower abdominal tenderness, no CMT or abnormal vaginal discharge noted. Given patient with persistent lower abdominal pain associated with nausea vomiting and TVUS findings concerning for bilateral pyosalpinx, will admit to GYN for IV antibiotic treatment.    - Admit patient to GYN (Dr. Lama)  - Ceftriaxone, Doxy, Flagyl IV   - AM CBC, monitor leukocytosis  - f/u gonorrhea/chlamydia swab  - f/u urine culture     FLORY Carr  Seen w/ Dr. Calderon (PGY2)  d/w Dr. Scott     Gyn Attending Admit Note  Agree with above  24 y/o  with possible bilateral pyosalpinx.    VSS, afebrile  PE -   Ab - soft/tender/+guarding  WBC 4.36    Pt with severe nausea and vomiting and is unable to tolerate po    Will     1) Admit to Gyn for IV abx due to inability to tolerate po  2) IV abx   3) Daily CBC  4) F/U Cx    Will follow    ORIANA Scott

## 2024-03-25 NOTE — ED PROVIDER NOTE - PROGRESS NOTE DETAILS
FLORY Armas: US showing b/l pyosalpinx, spoke with GYN will see pt in the ED FLORY Armas: GYN recommending admission, IV ceftriaxone, flagyl and doxy. Pt aware of admission

## 2024-03-26 LAB
ANION GAP SERPL CALC-SCNC: 10 MMOL/L — SIGNIFICANT CHANGE UP (ref 7–14)
ANION GAP SERPL CALC-SCNC: 11 MMOL/L — SIGNIFICANT CHANGE UP (ref 7–14)
ANISOCYTOSIS BLD QL: SIGNIFICANT CHANGE UP
BASOPHILS # BLD AUTO: 0 K/UL — SIGNIFICANT CHANGE UP (ref 0–0.2)
BASOPHILS NFR BLD AUTO: 0 % — SIGNIFICANT CHANGE UP (ref 0–2)
BLD GP AB SCN SERPL QL: NEGATIVE — SIGNIFICANT CHANGE UP
BUN SERPL-MCNC: 6 MG/DL — LOW (ref 7–23)
BUN SERPL-MCNC: 7 MG/DL — SIGNIFICANT CHANGE UP (ref 7–23)
C TRACH RRNA SPEC QL NAA+PROBE: DETECTED
C TRACH RRNA SPEC QL NAA+PROBE: DETECTED
CALCIUM SERPL-MCNC: 9.4 MG/DL — SIGNIFICANT CHANGE UP (ref 8.4–10.5)
CALCIUM SERPL-MCNC: 9.4 MG/DL — SIGNIFICANT CHANGE UP (ref 8.4–10.5)
CHLORIDE SERPL-SCNC: 103 MMOL/L — SIGNIFICANT CHANGE UP (ref 98–107)
CHLORIDE SERPL-SCNC: 104 MMOL/L — SIGNIFICANT CHANGE UP (ref 98–107)
CO2 SERPL-SCNC: 22 MMOL/L — SIGNIFICANT CHANGE UP (ref 22–31)
CO2 SERPL-SCNC: 23 MMOL/L — SIGNIFICANT CHANGE UP (ref 22–31)
CREAT SERPL-MCNC: 0.91 MG/DL — SIGNIFICANT CHANGE UP (ref 0.5–1.3)
CREAT SERPL-MCNC: 0.96 MG/DL — SIGNIFICANT CHANGE UP (ref 0.5–1.3)
CULTURE RESULTS: SIGNIFICANT CHANGE UP
EGFR: 86 ML/MIN/1.73M2 — SIGNIFICANT CHANGE UP
EGFR: 92 ML/MIN/1.73M2 — SIGNIFICANT CHANGE UP
EOSINOPHIL # BLD AUTO: 0 K/UL — SIGNIFICANT CHANGE UP (ref 0–0.5)
EOSINOPHIL NFR BLD AUTO: 0 % — SIGNIFICANT CHANGE UP (ref 0–6)
GIANT PLATELETS BLD QL SMEAR: PRESENT — SIGNIFICANT CHANGE UP
GLUCOSE SERPL-MCNC: 114 MG/DL — HIGH (ref 70–99)
GLUCOSE SERPL-MCNC: 99 MG/DL — SIGNIFICANT CHANGE UP (ref 70–99)
HCT VFR BLD CALC: 31 % — LOW (ref 34.5–45)
HGB BLD-MCNC: 10 G/DL — LOW (ref 11.5–15.5)
IANC: 1.76 K/UL — LOW (ref 1.8–7.4)
LYMPHOCYTES # BLD AUTO: 2.03 K/UL — SIGNIFICANT CHANGE UP (ref 1–3.3)
LYMPHOCYTES # BLD AUTO: 46.5 % — HIGH (ref 13–44)
MANUAL SMEAR VERIFICATION: SIGNIFICANT CHANGE UP
MCHC RBC-ENTMCNC: 24.8 PG — LOW (ref 27–34)
MCHC RBC-ENTMCNC: 32.3 GM/DL — SIGNIFICANT CHANGE UP (ref 32–36)
MCV RBC AUTO: 76.7 FL — LOW (ref 80–100)
METAMYELOCYTES # FLD: 0.9 % — SIGNIFICANT CHANGE UP (ref 0–1)
MICROCYTES BLD QL: SIGNIFICANT CHANGE UP
MONOCYTES # BLD AUTO: 0.34 K/UL — SIGNIFICANT CHANGE UP (ref 0–0.9)
MONOCYTES NFR BLD AUTO: 7.9 % — SIGNIFICANT CHANGE UP (ref 2–14)
N GONORRHOEA RRNA SPEC QL NAA+PROBE: SIGNIFICANT CHANGE UP
N GONORRHOEA RRNA SPEC QL NAA+PROBE: SIGNIFICANT CHANGE UP
NEUTROPHILS # BLD AUTO: 1.95 K/UL — SIGNIFICANT CHANGE UP (ref 1.8–7.4)
NEUTROPHILS NFR BLD AUTO: 44.7 % — SIGNIFICANT CHANGE UP (ref 43–77)
PLAT MORPH BLD: NORMAL — SIGNIFICANT CHANGE UP
PLATELET # BLD AUTO: 392 K/UL — SIGNIFICANT CHANGE UP (ref 150–400)
PLATELET COUNT - ESTIMATE: NORMAL — SIGNIFICANT CHANGE UP
POIKILOCYTOSIS BLD QL AUTO: SLIGHT — SIGNIFICANT CHANGE UP
POLYCHROMASIA BLD QL SMEAR: SLIGHT — SIGNIFICANT CHANGE UP
POTASSIUM SERPL-MCNC: 3.6 MMOL/L — SIGNIFICANT CHANGE UP (ref 3.5–5.3)
POTASSIUM SERPL-MCNC: 4 MMOL/L — SIGNIFICANT CHANGE UP (ref 3.5–5.3)
POTASSIUM SERPL-SCNC: 3.6 MMOL/L — SIGNIFICANT CHANGE UP (ref 3.5–5.3)
POTASSIUM SERPL-SCNC: 4 MMOL/L — SIGNIFICANT CHANGE UP (ref 3.5–5.3)
RBC # BLD: 4.04 M/UL — SIGNIFICANT CHANGE UP (ref 3.8–5.2)
RBC # FLD: 12.8 % — SIGNIFICANT CHANGE UP (ref 10.3–14.5)
RBC BLD AUTO: ABNORMAL
RH IG SCN BLD-IMP: POSITIVE — SIGNIFICANT CHANGE UP
SODIUM SERPL-SCNC: 136 MMOL/L — SIGNIFICANT CHANGE UP (ref 135–145)
SODIUM SERPL-SCNC: 137 MMOL/L — SIGNIFICANT CHANGE UP (ref 135–145)
SPECIMEN SOURCE: SIGNIFICANT CHANGE UP
WBC # BLD: 4.36 K/UL — SIGNIFICANT CHANGE UP (ref 3.8–10.5)
WBC # FLD AUTO: 4.36 K/UL — SIGNIFICANT CHANGE UP (ref 3.8–10.5)

## 2024-03-26 PROCEDURE — 99232 SBSQ HOSP IP/OBS MODERATE 35: CPT | Mod: GC

## 2024-03-26 RX ORDER — SODIUM CHLORIDE 9 MG/ML
1000 INJECTION, SOLUTION INTRAVENOUS
Refills: 0 | Status: DISCONTINUED | OUTPATIENT
Start: 2024-03-26 | End: 2024-03-27

## 2024-03-26 RX ORDER — ACETAMINOPHEN 500 MG
3 TABLET ORAL
Qty: 0 | Refills: 0 | DISCHARGE
Start: 2024-03-26

## 2024-03-26 RX ORDER — METRONIDAZOLE 500 MG
500 TABLET ORAL EVERY 12 HOURS
Refills: 0 | Status: DISCONTINUED | OUTPATIENT
Start: 2024-03-26 | End: 2024-03-27

## 2024-03-26 RX ORDER — METRONIDAZOLE 500 MG
1 TABLET ORAL
Qty: 24 | Refills: 0
Start: 2024-03-26 | End: 2024-04-06

## 2024-03-26 RX ORDER — SODIUM CHLORIDE 9 MG/ML
1000 INJECTION, SOLUTION INTRAVENOUS
Refills: 0 | Status: DISCONTINUED | OUTPATIENT
Start: 2024-03-26 | End: 2024-03-26

## 2024-03-26 RX ADMIN — SODIUM CHLORIDE 125 MILLILITER(S): 9 INJECTION, SOLUTION INTRAVENOUS at 17:12

## 2024-03-26 RX ADMIN — Medication 600 MILLIGRAM(S): at 11:46

## 2024-03-26 RX ADMIN — Medication 100 MILLIGRAM(S): at 05:14

## 2024-03-26 RX ADMIN — Medication 100 MILLIGRAM(S): at 19:21

## 2024-03-26 RX ADMIN — ONDANSETRON 4 MILLIGRAM(S): 8 TABLET, FILM COATED ORAL at 17:57

## 2024-03-26 RX ADMIN — Medication 600 MILLIGRAM(S): at 00:53

## 2024-03-26 RX ADMIN — SENNA PLUS 2 TABLET(S): 8.6 TABLET ORAL at 21:16

## 2024-03-26 RX ADMIN — Medication 600 MILLIGRAM(S): at 18:49

## 2024-03-26 RX ADMIN — Medication 975 MILLIGRAM(S): at 19:27

## 2024-03-26 RX ADMIN — Medication 600 MILLIGRAM(S): at 06:14

## 2024-03-26 RX ADMIN — CEFTRIAXONE 100 MILLIGRAM(S): 500 INJECTION, POWDER, FOR SOLUTION INTRAMUSCULAR; INTRAVENOUS at 16:24

## 2024-03-26 RX ADMIN — Medication 975 MILLIGRAM(S): at 05:14

## 2024-03-26 RX ADMIN — Medication 600 MILLIGRAM(S): at 13:20

## 2024-03-26 RX ADMIN — Medication 975 MILLIGRAM(S): at 12:20

## 2024-03-26 RX ADMIN — Medication 975 MILLIGRAM(S): at 11:47

## 2024-03-26 RX ADMIN — SIMETHICONE 80 MILLIGRAM(S): 80 TABLET, CHEWABLE ORAL at 18:49

## 2024-03-26 RX ADMIN — SODIUM CHLORIDE 125 MILLILITER(S): 9 INJECTION, SOLUTION INTRAVENOUS at 19:22

## 2024-03-26 RX ADMIN — FAMOTIDINE 20 MILLIGRAM(S): 10 INJECTION INTRAVENOUS at 05:14

## 2024-03-26 RX ADMIN — SIMETHICONE 80 MILLIGRAM(S): 80 TABLET, CHEWABLE ORAL at 11:46

## 2024-03-26 RX ADMIN — Medication 975 MILLIGRAM(S): at 18:51

## 2024-03-26 RX ADMIN — SODIUM CHLORIDE 75 MILLILITER(S): 9 INJECTION, SOLUTION INTRAVENOUS at 08:00

## 2024-03-26 RX ADMIN — FAMOTIDINE 20 MILLIGRAM(S): 10 INJECTION INTRAVENOUS at 18:50

## 2024-03-26 RX ADMIN — Medication 500 MILLIGRAM(S): at 18:50

## 2024-03-26 RX ADMIN — SIMETHICONE 80 MILLIGRAM(S): 80 TABLET, CHEWABLE ORAL at 05:14

## 2024-03-26 RX ADMIN — HEPARIN SODIUM 5000 UNIT(S): 5000 INJECTION INTRAVENOUS; SUBCUTANEOUS at 05:14

## 2024-03-26 RX ADMIN — Medication 600 MILLIGRAM(S): at 05:14

## 2024-03-26 RX ADMIN — Medication 975 MILLIGRAM(S): at 00:53

## 2024-03-26 RX ADMIN — HEPARIN SODIUM 5000 UNIT(S): 5000 INJECTION INTRAVENOUS; SUBCUTANEOUS at 18:51

## 2024-03-26 RX ADMIN — Medication 600 MILLIGRAM(S): at 19:27

## 2024-03-26 RX ADMIN — Medication 975 MILLIGRAM(S): at 06:14

## 2024-03-26 RX ADMIN — Medication 100 MILLIGRAM(S): at 06:20

## 2024-03-26 NOTE — PROGRESS NOTE ADULT - ASSESSMENT
Assessment/Plan: 23y HD#2, presented to the ED on 3/25 with lower abdominal pain and TVUS concerning for pyosalpinx. Abdominal pain improved from yesterday and nausea/vomiting resolved.      Neuro: Acetaminophen and Ibuprofen PRN  CV: Hemodynamically stable  Pulm: Saturating well on RA. Increase incentive spirometry.  GI: Regular diet, continue famotidine, simethicone and senna    : Voiding Spontaneously.  - Uptrending creatinine, likely hypovolemia from nausea and emesis yesterday. Will start maintenance fluids and repeat BMP in PM.   Heme: HSQ for DVT ppx  FEN: start LR@75, replete electrolytes PRN  ID: Afebrile  - s/p IV Ceftriaxone  - transition to PO Doxycycline and Flagyl, to be continued for total of 14 days  Endo: No active issues   Dispo: IVF hydration, transition to PO antibiotics, repeat BMP at 4PM.     Siomara Soto, PGY-1   Seen with GYN team  Assessment/Plan: 23y HD#2, presented to the ED on 3/25 with lower abdominal pain and TVUS concerning for pyosalpinx. Abdominal pain improved from yesterday and nausea/vomiting resolved.      Neuro: Acetaminophen and Ibuprofen PRN  CV: Hemodynamically stable  Pulm: Saturating well on RA. Increase incentive spirometry.  GI: Regular diet, continue famotidine, simethicone and senna    : Voiding Spontaneously.  - Uptrending creatinine, likely hypovolemia from nausea and emesis yesterday. Will start maintenance fluids and repeat BMP in PM.   Heme: HSQ for DVT ppx  FEN: start LR@75, replete electrolytes PRN  ID: Afebrile  - s/p IV Ceftriaxone  - transition to PO Doxycycline and Flagyl, to be continued for total of 14 days  Endo: No active issues   Dispo: IVF hydration, transition to PO antibiotics, repeat BMP at 3PM.     Siomara Soto, PGY-1   Seen with GYN team  Assessment/Plan: 23y HD#2, presented to the ED on 3/25 with lower abdominal pain and TVUS concerning for pyosalpinx. Abdominal pain improved from yesterday and nausea/vomiting resolved. Patient's VS are otherwise reassuring and patient has been afebrile.      Neuro: Acetaminophen and Ibuprofen PRN  CV: Hemodynamically stable  Pulm: Saturating well on RA. Increase incentive spirometry.  GI: Regular diet, continue famotidine, simethicone and senna    : Voiding Spontaneously.  - Uptrending creatinine noted, likely hypovolemia from nausea and emesis yesterday. Will start maintenance fluids and repeat BMP in PM.   Heme: HSQ for DVT ppx  FEN: Start LR@75, replete electrolytes PRN  ID: Afebrile  - s/p IV Ceftriaxone  - transition to PO Doxycycline and Flagyl, to be continued for total of 14 days  Endo: No active issues   Dispo: IVF hydration, transition to PO antibiotics, repeat BMP at 3PM.     Siomara Soto, PGY-1   Дмиртий Calderon, PGY-2  Seen with GYN team

## 2024-03-26 NOTE — DISCHARGE NOTE PROVIDER - NSDCMRMEDTOKEN_GEN_ALL_CORE_FT
acetaminophen 325 mg oral tablet: 3 tab(s) orally every 6 hours  cyclobenzaprine 10 mg oral tablet: 1 tab(s) orally 2 times a day   doxycycline monohydrate 100 mg oral tablet: 1 tab(s) orally 2 times a day  ibuprofen 600 mg oral tablet: 1 tab(s) orally 3 times a day   metroNIDAZOLE 500 mg oral tablet: 1 tab(s) orally every 12 hours   acetaminophen 325 mg oral tablet: 3 tab(s) orally every 6 hours  doxycycline monohydrate 100 mg oral capsule: 1 cap(s) orally every 12 hours Take one dose twice daily after food and Zofran. MDD: 2 capsules  ibuprofen 600 mg oral tablet: 1 tab(s) orally 3 times a day   metroNIDAZOLE 500 mg oral tablet: 1 tab(s) orally every 12 hours Take one capsule twice daily after food and Zofran MDD: 2 capsules  ondansetron 4 mg oral tablet, disintegratin tab(s) orally every 12 hours Take 1 tablet before taking your antibiotics. Can take PRN for nausea. Take no more than 2 tablets every 6 hours. MDD: 6 tablets  senna leaf extract oral tablet: 2 tab(s) orally once a day (at bedtime)  simethicone 80 mg oral tablet, chewable: 1 tab(s) orally every 6 hours

## 2024-03-26 NOTE — DISCHARGE NOTE PROVIDER - HOSPITAL COURSE
Patient presented with abdominal pain with evaluation in the ED demonstrating bilateral pyosalpinx. Patient was started on Ceftriaxone, Doxycycline, and Metronidazole. Initial evaluation without fever or leukocytosis. Cr however, noted to be elevated which was then repeated. On HD#2, patient was transitioned to PO antibiotics. She was afebrile, tolerating PO, ambulating, voiding, and symptomatically improved on day of discharge. HIV was negative and GC/CT was negative. Patient is going to have close follow-up with the [facility]      Patient presented with abdominal pain with evaluation in the ED demonstrating bilateral pyosalpinx. Patient was started on Ceftriaxone, Doxycycline, and Metronidazole. Initial evaluation without fever or leukocytosis. Cr however, noted to be elevated which was then repeated and then continued to down trend throughout her admission.   On HD#2, patient remained afebrile with no leukocytosis. Patient was transitioned to PO antibiotics for evening dose. Patient vomited shortly after taking oral dose of antibiotics.  On HD#3 patient was pre-medicated with anti-nausea medication prior to AM oral antibiotics but still had episode of emesis shortly after. Patient was transitioned back to IV Doxy/Flagyl due to inability to tolerate PO antibiotics. Patient's Chlamydia test came back positive.       She was afebrile, tolerating PO, ambulating, voiding, and symptomatically improved on day of discharge. HIV was negative. Patient is going to have close follow-up with [facility]      Patient presented with abdominal pain with evaluation in the ED demonstrating bilateral pyosalpinx. Patient was started on Ceftriaxone, Doxycycline, and Metronidazole. Initial evaluation without fever or leukocytosis. Cr however, noted to be elevated which was then repeated and then continued to down trend throughout her admission.   On HD#2, patient remained afebrile with no leukocytosis. Patient was transitioned to PO antibiotics for evening dose. Patient vomited shortly after taking oral dose of antibiotics.  On HD#3 patient was pre-medicated with anti-nausea medication prior to AM oral antibiotics but still had episode of emesis shortly after. Patient was transitioned back to IV Doxy/Flagyl due to inability to tolerate PO antibiotics for AM dosing. Patient's Chlamydia test came back positive. Patient ate food and took Zofran prior to PM PO Antibiotics and did not become nauseous or vomit.   On HD#4 Patient was able to tolerate her AM antibiotic dosing after food and Zofran. Her abdominal and pelvic pain had resolved. She was afebrile, tolerating PO, ambulating, voiding. HIV was negative. Patient is going to have close follow-up with the [facility] after finishing her course of antibiotics.

## 2024-03-26 NOTE — DISCHARGE NOTE PROVIDER - NSDCFUADDAPPT_GEN_ALL_CORE_FT
Please call to schedule a follow-up with the [facility] in x2 weeks  Please call to schedule a follow-up with the [facility] for an appointment in 2 weeks.

## 2024-03-26 NOTE — DISCHARGE NOTE PROVIDER - CARE PROVIDER_API CALL
[facility],   [facility], Ambulatory Care Unit  Oncology Building, Basement floor  [address] [place] [address]  [phone]  Phone: (   )    -  Fax: (   )    -  Follow Up Time: 2 weeks

## 2024-03-26 NOTE — DISCHARGE NOTE PROVIDER - PROVIDER TOKENS
FREE:[LAST:[[facility]],PHONE:[(   )    -],FAX:[(   )    -],ADDRESS:[[facility], Ambulatory Care Unit  Oncology Building, Basement floor  [address] [place] [address]  [phone]],FOLLOWUP:[2 weeks]]

## 2024-03-26 NOTE — PROGRESS NOTE ADULT - SUBJECTIVE AND OBJECTIVE BOX
Gyn Progress Note HD#2    Subjective:   Pt seen and examined at bedside. No events overnight. Abdominal pain improved from yesterday, still reporting mild periumbilical pain. Nausea has resolved, no more emesis overnight. Patient denies fever and chills overnight. Patient voiding spontaneously with regular BMs. Tolerating regular diet. Pt denies chest pain, SOB, lightheadedness, dizziness.     Objective:  T(F): 98.2 (03-26-24 @ 05:13), Max: 98.3 (03-25-24 @ 22:50)  HR: 64 (03-26-24 @ 05:13) (60 - 84)  BP: 114/77 (03-26-24 @ 05:13) (110/69 - 124/77)  RR: 16 (03-26-24 @ 05:13) (16 - 17)  SpO2: 100% (03-26-24 @ 05:13) (99% - 100%)  Wt(kg): --  I&O's Summary    25 Mar 2024 07:01  -  26 Mar 2024 06:13  --------------------------------------------------------  IN: 460 mL / OUT: 200 mL / NET: 260 mL      CAPILLARY BLOOD GLUCOSE          MEDICATIONS  (STANDING):  acetaminophen     Tablet .. 975 milliGRAM(s) Oral every 6 hours  cefTRIAXone   IVPB 1000 milliGRAM(s) IV Intermittent every 24 hours  doxycycline IVPB 100 milliGRAM(s) IV Intermittent every 12 hours  famotidine    Tablet 20 milliGRAM(s) Oral two times a day  heparin   Injectable 5000 Unit(s) SubCutaneous every 12 hours  ibuprofen  Tablet. 600 milliGRAM(s) Oral every 6 hours  metroNIDAZOLE  IVPB 500 milliGRAM(s) IV Intermittent every 12 hours  senna 2 Tablet(s) Oral at bedtime  simethicone 80 milliGRAM(s) Chew every 6 hours    MEDICATIONS  (PRN):  ondansetron Injectable 4 milliGRAM(s) IV Push every 6 hours PRN Nausea and/or Vomiting      Physical Exam:  Constitutional: NAD, Lying in bed   CV: Regular rate and rhythm. No murmurs appreciated   Lungs: Clear to auscultation  bilaterally. No respiratory distress  Abdomen: Soft. Mildly tender periumbilical area, no guarding or rebound. No pelvic tenderness. Non-distended. Bowel sounds present.  : Voiding spontaneously   Extremities: No calf tenderness bilaterally    LABS:  03-25    138    |  101    |  9      ----------------------------<  111<H>  4.0     |  27     |  0.90   03-24    136    |  100    |  9      ----------------------------<  99     3.9     |  24     |  0.77     Ca    10.0       25 Mar 2024 12:32  Ca    9.8        24 Mar 2024 19:20    TPro  8.5<H>  /  Alb  4.3    /  TBili  0.2    /  DBili  x      /  AST  13     /  ALT  6      /  AlkPhos  64     03-25  TPro  8.4<H>  /  Alb  4.1    /  TBili  0.2    /  DBili  x      /  AST  14     /  ALT  6      /  AlkPhos  62     03-24          Urinalysis Basic - ( 25 Mar 2024 12:32 )    Color: x / Appearance: x / SG: x / pH: x  Gluc: 111 mg/dL / Ketone: x  / Bili: x / Urobili: x   Blood: x / Protein: x / Nitrite: x   Leuk Esterase: x / RBC: x / WBC x   Sq Epi: x / Non Sq Epi: x / Bacteria: x                   Gyn Progress Note HD#2    Subjective:   Pt seen and examined at bedside. No events overnight. Abdominal pain improved from yesterday, still reporting mild periumbilical pain. Nausea has resolved, no more emesis overnight. Patient denies fever and chills overnight. Patient voiding spontaneously with regular BMs. Tolerating regular diet. Pt denies chest pain, SOB, lightheadedness, dizziness.     Objective:  T(F): 98.2 (03-26-24 @ 05:13), Max: 98.3 (03-25-24 @ 22:50)  HR: 64 (03-26-24 @ 05:13) (60 - 84)  BP: 114/77 (03-26-24 @ 05:13) (110/69 - 124/77)  RR: 16 (03-26-24 @ 05:13) (16 - 17)  SpO2: 100% (03-26-24 @ 05:13) (99% - 100%)  Wt(kg): --  I&O's Summary    25 Mar 2024 07:01  -  26 Mar 2024 06:13  --------------------------------------------------------  IN: 460 mL / OUT: 200 mL / NET: 260 mL      CAPILLARY BLOOD GLUCOSE          MEDICATIONS  (STANDING):  acetaminophen     Tablet .. 975 milliGRAM(s) Oral every 6 hours  cefTRIAXone   IVPB 1000 milliGRAM(s) IV Intermittent every 24 hours  doxycycline IVPB 100 milliGRAM(s) IV Intermittent every 12 hours  famotidine    Tablet 20 milliGRAM(s) Oral two times a day  heparin   Injectable 5000 Unit(s) SubCutaneous every 12 hours  ibuprofen  Tablet. 600 milliGRAM(s) Oral every 6 hours  metroNIDAZOLE  IVPB 500 milliGRAM(s) IV Intermittent every 12 hours  senna 2 Tablet(s) Oral at bedtime  simethicone 80 milliGRAM(s) Chew every 6 hours    MEDICATIONS  (PRN):  ondansetron Injectable 4 milliGRAM(s) IV Push every 6 hours PRN Nausea and/or Vomiting      Physical Exam:  Constitutional: NAD, Lying in bed   CV: Regular rate and rhythm. No murmurs appreciated   Lungs: Clear to auscultation  bilaterally. No respiratory distress  Abdomen: Soft. Mildly tender periumbilical area, no guarding or rebound. Non-distended. Bowel sounds present.  : Voiding spontaneously   Extremities: No calf tenderness bilaterally    LABS:  03-25    138    |  101    |  9      ----------------------------<  111<H>  4.0     |  27     |  0.90   03-24    136    |  100    |  9      ----------------------------<  99     3.9     |  24     |  0.77     Ca    10.0       25 Mar 2024 12:32  Ca    9.8        24 Mar 2024 19:20    TPro  8.5<H>  /  Alb  4.3    /  TBili  0.2    /  DBili  x      /  AST  13     /  ALT  6      /  AlkPhos  64     03-25  TPro  8.4<H>  /  Alb  4.1    /  TBili  0.2    /  DBili  x      /  AST  14     /  ALT  6      /  AlkPhos  62     03-24          Urinalysis Basic - ( 25 Mar 2024 12:32 )    Color: x / Appearance: x / SG: x / pH: x  Gluc: 111 mg/dL / Ketone: x  / Bili: x / Urobili: x   Blood: x / Protein: x / Nitrite: x   Leuk Esterase: x / RBC: x / WBC x   Sq Epi: x / Non Sq Epi: x / Bacteria: x

## 2024-03-27 LAB
ANION GAP SERPL CALC-SCNC: 11 MMOL/L — SIGNIFICANT CHANGE UP (ref 7–14)
BASOPHILS # BLD AUTO: 0.03 K/UL — SIGNIFICANT CHANGE UP (ref 0–0.2)
BASOPHILS NFR BLD AUTO: 0.7 % — SIGNIFICANT CHANGE UP (ref 0–2)
BUN SERPL-MCNC: 7 MG/DL — SIGNIFICANT CHANGE UP (ref 7–23)
CALCIUM SERPL-MCNC: 9.1 MG/DL — SIGNIFICANT CHANGE UP (ref 8.4–10.5)
CHLORIDE SERPL-SCNC: 104 MMOL/L — SIGNIFICANT CHANGE UP (ref 98–107)
CO2 SERPL-SCNC: 22 MMOL/L — SIGNIFICANT CHANGE UP (ref 22–31)
CREAT SERPL-MCNC: 0.83 MG/DL — SIGNIFICANT CHANGE UP (ref 0.5–1.3)
EGFR: 103 ML/MIN/1.73M2 — SIGNIFICANT CHANGE UP
EOSINOPHIL # BLD AUTO: 0.03 K/UL — SIGNIFICANT CHANGE UP (ref 0–0.5)
EOSINOPHIL NFR BLD AUTO: 0.7 % — SIGNIFICANT CHANGE UP (ref 0–6)
GLUCOSE SERPL-MCNC: 92 MG/DL — SIGNIFICANT CHANGE UP (ref 70–99)
HCT VFR BLD CALC: 31 % — LOW (ref 34.5–45)
HGB BLD-MCNC: 9.9 G/DL — LOW (ref 11.5–15.5)
IANC: 1.55 K/UL — LOW (ref 1.8–7.4)
IMM GRANULOCYTES NFR BLD AUTO: 0.2 % — SIGNIFICANT CHANGE UP (ref 0–0.9)
LYMPHOCYTES # BLD AUTO: 2.22 K/UL — SIGNIFICANT CHANGE UP (ref 1–3.3)
LYMPHOCYTES # BLD AUTO: 49.4 % — HIGH (ref 13–44)
MAGNESIUM SERPL-MCNC: 1.6 MG/DL — SIGNIFICANT CHANGE UP (ref 1.6–2.6)
MCHC RBC-ENTMCNC: 24.9 PG — LOW (ref 27–34)
MCHC RBC-ENTMCNC: 31.9 GM/DL — LOW (ref 32–36)
MCV RBC AUTO: 77.9 FL — LOW (ref 80–100)
MONOCYTES # BLD AUTO: 0.65 K/UL — SIGNIFICANT CHANGE UP (ref 0–0.9)
MONOCYTES NFR BLD AUTO: 14.5 % — HIGH (ref 2–14)
NEUTROPHILS # BLD AUTO: 1.55 K/UL — LOW (ref 1.8–7.4)
NEUTROPHILS NFR BLD AUTO: 34.5 % — LOW (ref 43–77)
NRBC # BLD: 0 /100 WBCS — SIGNIFICANT CHANGE UP (ref 0–0)
NRBC # FLD: 0 K/UL — SIGNIFICANT CHANGE UP (ref 0–0)
PHOSPHATE SERPL-MCNC: 3 MG/DL — SIGNIFICANT CHANGE UP (ref 2.5–4.5)
PLATELET # BLD AUTO: 306 K/UL — SIGNIFICANT CHANGE UP (ref 150–400)
POTASSIUM SERPL-MCNC: 3.7 MMOL/L — SIGNIFICANT CHANGE UP (ref 3.5–5.3)
POTASSIUM SERPL-SCNC: 3.7 MMOL/L — SIGNIFICANT CHANGE UP (ref 3.5–5.3)
RBC # BLD: 3.98 M/UL — SIGNIFICANT CHANGE UP (ref 3.8–5.2)
RBC # FLD: 13 % — SIGNIFICANT CHANGE UP (ref 10.3–14.5)
SODIUM SERPL-SCNC: 137 MMOL/L — SIGNIFICANT CHANGE UP (ref 135–145)
WBC # BLD: 4.49 K/UL — SIGNIFICANT CHANGE UP (ref 3.8–10.5)
WBC # FLD AUTO: 4.49 K/UL — SIGNIFICANT CHANGE UP (ref 3.8–10.5)

## 2024-03-27 PROCEDURE — 99254 IP/OBS CNSLTJ NEW/EST MOD 60: CPT

## 2024-03-27 RX ORDER — METRONIDAZOLE 500 MG
500 TABLET ORAL EVERY 12 HOURS
Refills: 0 | Status: DISCONTINUED | OUTPATIENT
Start: 2024-03-27 | End: 2024-03-28

## 2024-03-27 RX ORDER — METRONIDAZOLE 500 MG
500 TABLET ORAL ONCE
Refills: 0 | Status: COMPLETED | OUTPATIENT
Start: 2024-03-27 | End: 2024-03-27

## 2024-03-27 RX ADMIN — SIMETHICONE 80 MILLIGRAM(S): 80 TABLET, CHEWABLE ORAL at 05:20

## 2024-03-27 RX ADMIN — Medication 100 MILLIGRAM(S): at 05:20

## 2024-03-27 RX ADMIN — SODIUM CHLORIDE 125 MILLILITER(S): 9 INJECTION, SOLUTION INTRAVENOUS at 11:58

## 2024-03-27 RX ADMIN — FAMOTIDINE 20 MILLIGRAM(S): 10 INJECTION INTRAVENOUS at 18:03

## 2024-03-27 RX ADMIN — Medication 500 MILLIGRAM(S): at 22:03

## 2024-03-27 RX ADMIN — ONDANSETRON 4 MILLIGRAM(S): 8 TABLET, FILM COATED ORAL at 15:43

## 2024-03-27 RX ADMIN — ONDANSETRON 4 MILLIGRAM(S): 8 TABLET, FILM COATED ORAL at 05:20

## 2024-03-27 RX ADMIN — CEFTRIAXONE 100 MILLIGRAM(S): 500 INJECTION, POWDER, FOR SOLUTION INTRAMUSCULAR; INTRAVENOUS at 16:22

## 2024-03-27 RX ADMIN — Medication 100 MILLIGRAM(S): at 18:03

## 2024-03-27 RX ADMIN — ONDANSETRON 4 MILLIGRAM(S): 8 TABLET, FILM COATED ORAL at 21:24

## 2024-03-27 RX ADMIN — Medication 100 MILLIGRAM(S): at 08:24

## 2024-03-27 RX ADMIN — FAMOTIDINE 20 MILLIGRAM(S): 10 INJECTION INTRAVENOUS at 05:20

## 2024-03-27 RX ADMIN — Medication 100 MILLIGRAM(S): at 07:01

## 2024-03-27 RX ADMIN — Medication 500 MILLIGRAM(S): at 05:20

## 2024-03-27 RX ADMIN — SODIUM CHLORIDE 125 MILLILITER(S): 9 INJECTION, SOLUTION INTRAVENOUS at 05:20

## 2024-03-27 NOTE — CONSULT NOTE ADULT - SUBJECTIVE AND OBJECTIVE BOX
Patient is a 23y old  Female who presents with a chief complaint of pyosalpinx (27 Mar 2024 06:29)    HPI:  22yo G0, LMP 3/21/2024, who presents to the ED for persistent periumbilical/lower abdominal pain that started this past Saturday. She has tried Acetaminophen without any improvement in the pain. Patient additionally endorses lower back pain. States that she additionally has had intermittent n/v. Otherwise, passing gas and having BMs. Of note, patient had presented to the ED with similar complaints yesterday. Denies any fevers, chills, chest pain, shortness of breath, dysuria, abnormal vaginal bleeding, or vaginal discharge     Patient is sexually active and reports having had x3 male partners within the last year. Reports using condoms regularly and states she was last tested earlier this year for STDs    (25 Mar 2024 15:55)       REVIEW OF SYSTEMS  Constitutional: No fevers, chills, weight loss or fatigue   Skin: No rash, no phlebitis	  Eyes: No discharge	  ENMT: No sore throat, oral thrush, ulcers or exudate  Respiratory: No cough, no SOB  Cardiovascular:  No chest pain, palpitations or edema   Gastrointestinal: No pain, nausea, vomiting, diarrhea or constipation	  Genitourinary: No dysuria, discharge or flank pain  MSK: No arthralgias or back pain   Neurological: No HA, no weakness, no seizures, no AMS       prior hospital charts reviewed [V]  primary team notes reviewed [V]  other consultant notes reviewed [V]    PAST MEDICAL & SURGICAL HISTORY:  Anemia      No significant past surgical history    SOCIAL HISTORY:  - Denied smoking/vaping/alcohol/recreational drug use    FAMILY HISTORY:      Allergies  No Known Allergies        ANTIMICROBIALS:  cefTRIAXone   IVPB 1000 every 24 hours      ANTIMICROBIALS (past 90 days):  MEDICATIONS  (STANDING):  cefTRIAXone   IVPB   100 mL/Hr IV Intermittent (03-26-24 @ 16:24)    cefTRIAXone   IVPB   100 mL/Hr IV Intermittent (03-25-24 @ 16:14)    doxycycline IVPB   100 mL/Hr IV Intermittent (03-26-24 @ 06:20)   100 mL/Hr IV Intermittent (03-25-24 @ 17:50)    doxycycline IVPB   100 mL/Hr IV Intermittent (03-27-24 @ 08:24)    doxycycline monohydrate Capsule   100 milliGRAM(s) Oral (03-27-24 @ 05:20)   100 milliGRAM(s) Oral (03-26-24 @ 19:21)    metroNIDAZOLE    Tablet   500 milliGRAM(s) Oral (03-27-24 @ 05:20)   500 milliGRAM(s) Oral (03-26-24 @ 18:50)    metroNIDAZOLE  IVPB   100 mL/Hr IV Intermittent (03-27-24 @ 07:01)    metroNIDAZOLE  IVPB   100 mL/Hr IV Intermittent (03-25-24 @ 16:50)    metroNIDAZOLE  IVPB   100 mL/Hr IV Intermittent (03-26-24 @ 05:14)        OTHER MEDS:   MEDICATIONS  (STANDING):  acetaminophen     Tablet .. 975 every 6 hours  famotidine    Tablet 20 two times a day  heparin   Injectable 5000 every 12 hours  ibuprofen  Tablet. 600 every 6 hours  ondansetron Injectable 4 every 6 hours PRN  senna 2 at bedtime  simethicone 80 every 6 hours      VITALS:  Vital Signs Last 24 Hrs  T(F): 98.3 (03-27-24 @ 09:51), Max: 98.6 (03-24-24 @ 20:46)    Vital Signs Last 24 Hrs  HR: 68 (03-27-24 @ 09:51) (61 - 79)  BP: 110/47 (03-27-24 @ 09:51) (110/47 - 124/77)  RR: 16 (03-27-24 @ 09:51)  SpO2: 100% (03-27-24 @ 09:51) (96% - 100%)  Wt(kg): --    EXAM:  General: Patient in no acute distress   HEENT: NCAT, EOMI, PERRL, no oral lesions  CV: S1+S2, no m/r/g appreciated   Lungs: No respiratory distress, CTAB  Abd: Soft, nontender, no guarding, no rebound tenderness, + bowel sounds   Ext: No cyanosis, no edema  Neuro: Alert and oriented, no focal deficits, CN II-XII grossly intact   Skin: No rash   IV: No phlebitis      Labs:                        9.9    4.49  )-----------( 306      ( 27 Mar 2024 05:15 )             31.0     03-27    137  |  104  |  7   ----------------------------<  92  3.7   |  22  |  0.83    Ca    9.1      27 Mar 2024 05:15  Phos  3.0     03-27  Mg     1.60     03-27    TPro  8.5<H>  /  Alb  4.3  /  TBili  0.2  /  DBili  x   /  AST  13  /  ALT  6   /  AlkPhos  64  03-25      WBC Trend:  WBC Count: 4.49 (03-27-24 @ 05:15)  WBC Count: 4.36 (03-26-24 @ 05:27)  WBC Count: 6.53 (03-25-24 @ 12:32)  WBC Count: 5.88 (03-24-24 @ 19:20)      Auto Neutrophil #: 1.55 K/uL (03-27-24 @ 05:15)  Auto Neutrophil #: 1.95 K/uL (03-26-24 @ 05:27)  Auto Neutrophil #: 4.80 K/uL (03-25-24 @ 12:32)  Auto Neutrophil #: 4.47 K/uL (03-24-24 @ 19:20)      Creatine Trend:  Creatinine: 0.83 (03-27)  Creatinine: 0.91 (03-26)  Creatinine: 0.96 (03-26)  Creatinine: 0.90 (03-25)      Liver Biochemical Testing Trend:  Alanine Aminotransferase (ALT/SGPT): 6 (03-25)  Alanine Aminotransferase (ALT/SGPT): 6 (03-24)  Aspartate Aminotransferase (AST/SGOT): 13 (03-25-24 @ 12:32)  Aspartate Aminotransferase (AST/SGOT): 14 (03-24-24 @ 19:20)  Bilirubin Total: 0.2 (03-25)  Bilirubin Total: 0.2 (03-24)      Trend LDH      Auto Eosinophil %: 0.7 % (03-27-24 @ 05:15)  Auto Eosinophil %: 0.0 % (03-26-24 @ 05:27)  Auto Eosinophil %: 0.2 % (03-25-24 @ 12:32)  Auto Eosinophil %: 0.2 % (03-24-24 @ 19:20)      Urinalysis Basic - ( 27 Mar 2024 05:15 )    Color: x / Appearance: x / SG: x / pH: x  Gluc: 92 mg/dL / Ketone: x  / Bili: x / Urobili: x   Blood: x / Protein: x / Nitrite: x   Leuk Esterase: x / RBC: x / WBC x   Sq Epi: x / Non Sq Epi: x / Bacteria: x        MICROBIOLOGY:        Culture - Urine (collected 25 Mar 2024 12:23)  Source: Clean Catch Clean Catch (Midstream)  Final Report:    <10,000 CFU/mL Normal Urogenital Rosa    Culture - Urine (collected 24 Mar 2024 20:46)  Source: Clean Catch Clean Catch (Midstream)  Final Report:    <10,000 CFU/mL Normal Urogenital Rosa      HIV-1/2 Combo Result: Nonreact (03-25-24 @ 12:32)    RADIOLOGY:  imaging below personally reviewed    < from: US Transvaginal (03.25.24 @ 13:51) >  IMPRESSION:  Bilateral prominent fallopian tubes with intraluminal debris and   surrounding increased vascularity. Findings suggest pyosalpinx. Correlate   with clinical and laboratory findings.    < end of copied text >  < from: CT Abdomen and Pelvis w/ IV Cont (03.24.24 @ 22:07) >  IMPRESSION:  No bowel obstruction.    Nonspecific partially enhancing loculated free fluid in the pelvis.   Correlatefor prior PID, endometriosis, surgery, or episodes of bowel   inflammation to account for a peritoneal inclusion cyst.    Suggestion of polycystic appearance of the ovaries which can be confirmed   with ultrasound if clinically warranted.    < end of copied text >

## 2024-03-27 NOTE — PROVIDER CONTACT NOTE (OTHER) - SITUATION
Pt. is C/o pain in RUE at antecubital site where she said lab draw was done this morning. C/O feeling of lump formation at site.

## 2024-03-27 NOTE — PROGRESS NOTE ADULT - SUBJECTIVE AND OBJECTIVE BOX
Gyn Progress Note  HD#3    Subjective:   Pt seen and examined at bedside. Patient had several episodes of NBNB emesis overnight and reports nausea this morning. Reports nausea and emesis is around timing of antibiotic administration.  Denies abdominal and pelvic pain. Reports flatus. Reports poor appetite, decreased PO intake. Pt denies fever, chills, chest pain, SOB, nausea, vomiting, lightheadedness, dizziness.      Objective:  T(F): 98 (03-27-24 @ 05:20), Max: 98.4 (03-26-24 @ 09:02)  HR: 61 (03-27-24 @ 05:20) (61 - 79)  BP: 119/80 (03-27-24 @ 05:20) (114/63 - 124/77)  RR: 16 (03-27-24 @ 05:20) (16 - 18)  SpO2: 100% (03-27-24 @ 05:20) (96% - 100%)  Wt(kg): --  I&O's Summary    25 Mar 2024 07:01  -  26 Mar 2024 07:00  --------------------------------------------------------  IN: 560 mL / OUT: 250 mL / NET: 310 mL    26 Mar 2024 07:01  -  27 Mar 2024 06:29  --------------------------------------------------------  IN: 1810 mL / OUT: 1650 mL / NET: 160 mL      CAPILLARY BLOOD GLUCOSE          MEDICATIONS  (STANDING):  acetaminophen     Tablet .. 975 milliGRAM(s) Oral every 6 hours  cefTRIAXone   IVPB 1000 milliGRAM(s) IV Intermittent every 24 hours  doxycycline monohydrate Capsule 100 milliGRAM(s) Oral every 12 hours  famotidine    Tablet 20 milliGRAM(s) Oral two times a day  heparin   Injectable 5000 Unit(s) SubCutaneous every 12 hours  ibuprofen  Tablet. 600 milliGRAM(s) Oral every 6 hours  lactated ringers. 1000 milliLiter(s) (125 mL/Hr) IV Continuous <Continuous>  metroNIDAZOLE    Tablet 500 milliGRAM(s) Oral every 12 hours  senna 2 Tablet(s) Oral at bedtime  simethicone 80 milliGRAM(s) Chew every 6 hours    MEDICATIONS  (PRN):  ondansetron Injectable 4 milliGRAM(s) IV Push every 6 hours PRN Nausea and/or Vomiting      Physical Exam:  Constitutional: NAD, Lying in bed   CV: Regular rate and rhythm. No murmurs appreciated   Lungs: Clear to auscultation bilaterally. No respiratory distress  Abdomen: Soft. Non-tender. Non-distended. Bowel sounds present  : Voiding spontaneously  Extremities: No calf tenderness bilaterally    LABS:  03-26    136    |  103    |  6<L>   ----------------------------<  114<H>  4.0     |  22     |  0.91   03-26    137    |  104    |  7      ----------------------------<  99     3.6     |  23     |  0.96     Ca    9.4        26 Mar 2024 14:38  Ca    9.4        26 Mar 2024 05:27            Urinalysis Basic - ( 26 Mar 2024 14:38 )    Color: x / Appearance: x / SG: x / pH: x  Gluc: 114 mg/dL / Ketone: x  / Bili: x / Urobili: x   Blood: x / Protein: x / Nitrite: x   Leuk Esterase: x / RBC: x / WBC x   Sq Epi: x / Non Sq Epi: x / Bacteria: x

## 2024-03-27 NOTE — CONSULT NOTE ADULT - ATTENDING COMMENTS
24 yo G0, LMP 3/21/2024, who presents to the ED for persistent periumbilical/lower abdominal pain that started this past Saturday  No fever, no leukocytosis  Initially with abd pain/periumbilical pain  Sexually active, 1 partner (3 partners over past year)  Chlamydia NA positive urine  HIV neg  Overall, PID, chlamydia  - Doxycycline 100mg q 12, 14 days (try while administering with food)  - Flagyl 500mg q 12, 14 days  - Ceftriaxone 1g q 24, DC on discharge  - Would ensure able to tolerate PO prior to discharge, try giving PO meds with food  - Check Syphilis screening test  - Offer follow up in ID clinic with Dr. Watson, [phone] if patient wants PREP    Tray Desai MD  Contact on TEAMS messaging from 9am - 5pm  From 5pm-9am, on weekends, or if no response call [phone]    I was physically present for the key portions of the evaluation and management service provided. I saw and examined the patient. I agree with the above history, physical, and plan except for any discrepancies which I have documented in “Attending Statements.” Please refer to “Attending Statements” for final plan.

## 2024-03-27 NOTE — PROGRESS NOTE ADULT - ASSESSMENT
Assessment/Plan: 23y HD#3, presented to the ED on 3/25 with lower abdominal pain and TVUS concerning for pyosalpinx. Urine Chlamydia resulted positive, patient informed and reports understanding. Abdominal pain improved from yesterday, however with nausea and emesis around timing of antibiotic administration. Patient's VS are otherwise reassuring and patient has been afebrile.      Neuro: Acetaminophen and Ibuprofen PRN  CV: Hemodynamically stable  Pulm: Saturating well on RA. Increase incentive spirometry.  GI: Regular diet, continue famotidine, simethicone and senna   - Continue Zofran PRN for nausea/vomiting  - Consider other antiemetics if N/V continues    : Voiding Spontaneously.  - Uptrend in creatinine from baseline noted, stabilized yesterday, likely hypovolemia from nausea and emesis. Follow up AM BMP and continue maintenance IVF  Heme: HSQ for DVT ppx  FEN: Continue LR@125, replete electrolytes PRN  ID: Afebrile  - Urine Chlamydia positive.   - IV Ceftriaxone, Doxycyline, Flagyl (3/25- ), transition back to IV this AM due to persistent emesis. Patient received PO Rx this am however vomited it immediately.   Endo: No active issues   Dispo: Continue inpatient care for IV hydration and transition to IV antibiotics. Consider alternative antiemetics if N/V persists after transition to IV Rx.     Siomara Soto, PGY1  Seen with GYN team

## 2024-03-27 NOTE — CONSULT NOTE ADULT - ASSESSMENT
22yo G0, LMP 3/21/2024, who presented for persistent periumbilical/lower abdominal pain x2 days with associated nausea/vomiting. She has been afebrile without leukocytosis, bland UA. +Chlamydia amplification. CT a/p showed small volume of loculated free fluid in the cul-de-sac, partially enhancing measuring 3.4 x 3.0 cm. TVUS showed bilateral prominent fallopian tubes with intraluminal debris and surrounding increased vascularity, suggesting pyosalpinx. She was transitioned to PO doxycycline and metronidazole on 3/26 but experienced nausea/vomiting.    Micro:  UCx x2: normal digna  Chlamydia amplifcation +  Gonorrhea negative  HIV negative    Abx:  Doxycycline (3/25-3/27)  Metronidazole (3/25-3/27)  Ceftriaxone (3/25-) 24yo G0, LMP 3/21/2024, who presented for persistent periumbilical/lower abdominal pain x2 days with associated nausea/vomiting. She has been afebrile without leukocytosis, bland UA. +Chlamydia amplification. CT a/p showed small volume of loculated free fluid in the cul-de-sac, partially enhancing measuring 3.4 x 3.0 cm. TVUS showed bilateral prominent fallopian tubes with intraluminal debris and surrounding increased vascularity, suggesting pyosalpinx. She was transitioned to PO doxycycline and metronidazole on 3/26 but experienced nausea/vomiting when taken on empty stomach.    Micro:  UCx x2: normal digna  Chlamydia amplifcation +  Gonorrhea negative  HIV negative    Abx:  Doxycycline (3/25-3/27)  Metronidazole (3/25-3/27)  Ceftriaxone (3/25-)    Overall, PID, +chlamydia. Doxycycline is the best treatment for chlamydia-associated PID.    Suggest:  - Re-challenge with PO doxycycline and PO metronidazole with food  - If she is still unable to tolerate with a meal, other options will need to be considered including azithromycin however it is suboptimal  - Will plan to treat for 14 day course of doxycyline 100 mg BID and metronidazole 500 mg BID if able to tolerate  - She should also follow-up in ID clinic to discuss HIV prep - we discussed today but she wants to think about it    d/w attending.    Carlito Watson, PGY4  ID Fellow  [employer] Teams Preferred  After 5pm/weekends call [phone] 22yo G0, LMP 3/21/2024, who presented for persistent periumbilical/lower abdominal pain x2 days with associated nausea/vomiting. She has been afebrile without leukocytosis, bland UA. +Chlamydia amplification. CT a/p showed small volume of loculated free fluid in the cul-de-sac, partially enhancing measuring 3.4 x 3.0 cm. TVUS showed bilateral prominent fallopian tubes with intraluminal debris and surrounding increased vascularity, suggesting pyosalpinx. She was transitioned to PO doxycycline and metronidazole on 3/26 but experienced nausea/vomiting when taken on empty stomach.    Micro:  UCx x2: normal digna  Chlamydia amplifcation +  Gonorrhea negative  HIV negative    Abx:  Doxycycline (3/25-3/27)  Metronidazole (3/25-3/27)  Ceftriaxone (3/25-)    Overall, PID, +chlamydia. Doxycycline is the best treatment for chlamydia-associated PID.    Suggest:  - Re-challenge with PO doxycycline and PO metronidazole with food  - If she is still unable to tolerate with a meal, other options will need to be considered including azithromycin however it is suboptimal  - Will plan to treat for 14 day course of doxycyline 100 mg BID and metronidazole 500 mg BID if able to tolerate  - check syphilis screen  - She should also follow-up in ID clinic to discuss HIV prep - we discussed today but she wants to think about it    d/w attending.    Carlito Watson, PGY4  ID Fellow  [employer] Teams Preferred  After 5pm/weekends call [phone] -Fewer than 5 wet diapers per day

## 2024-03-28 VITALS
HEART RATE: 62 BPM | TEMPERATURE: 97 F | OXYGEN SATURATION: 100 % | RESPIRATION RATE: 17 BRPM | DIASTOLIC BLOOD PRESSURE: 70 MMHG | SYSTOLIC BLOOD PRESSURE: 112 MMHG

## 2024-03-28 LAB — T PALLIDUM AB TITR SER: NEGATIVE — SIGNIFICANT CHANGE UP

## 2024-03-28 RX ORDER — ONDANSETRON 8 MG/1
1 TABLET, FILM COATED ORAL
Qty: 3 | Refills: 0
Start: 2024-03-28

## 2024-03-28 RX ORDER — SIMETHICONE 80 MG/1
1 TABLET, CHEWABLE ORAL
Qty: 0 | Refills: 0 | DISCHARGE
Start: 2024-03-28

## 2024-03-28 RX ORDER — ONDANSETRON 8 MG/1
4 TABLET, FILM COATED ORAL EVERY 6 HOURS
Refills: 0 | Status: DISCONTINUED | OUTPATIENT
Start: 2024-03-28 | End: 2024-03-28

## 2024-03-28 RX ORDER — METRONIDAZOLE 500 MG
1 TABLET ORAL
Qty: 22 | Refills: 0
Start: 2024-03-28 | End: 2024-04-07

## 2024-03-28 RX ORDER — SENNA PLUS 8.6 MG/1
2 TABLET ORAL
Qty: 0 | Refills: 0 | DISCHARGE
Start: 2024-03-28

## 2024-03-28 RX ADMIN — Medication 100 MILLIGRAM(S): at 05:52

## 2024-03-28 RX ADMIN — Medication 500 MILLIGRAM(S): at 10:30

## 2024-03-28 RX ADMIN — FAMOTIDINE 20 MILLIGRAM(S): 10 INJECTION INTRAVENOUS at 05:52

## 2024-03-28 RX ADMIN — ONDANSETRON 4 MILLIGRAM(S): 8 TABLET, FILM COATED ORAL at 05:24

## 2024-03-28 RX ADMIN — ONDANSETRON 4 MILLIGRAM(S): 8 TABLET, FILM COATED ORAL at 09:57

## 2024-03-28 NOTE — PROGRESS NOTE ADULT - SUBJECTIVE AND OBJECTIVE BOX
Gyn Progress Note HD#4    Subjective:   Pt seen and examined at bedside. No events overnight. Patient tolerated PO antibiotics after eating food and taking Zofran. Abdominal and pelvic pain has resolved. Patient has been voiding spontaneously and having regular bowel movements. Tolerating regular diet. Pt denies fever, chills, chest pain, SOB, nausea, vomiting, lightheadedness, dizziness.      Objective:  T(F): 98.3 (03-28-24 @ 05:20), Max: 98.3 (03-27-24 @ 09:51)  HR: 72 (03-28-24 @ 05:20) (64 - 96)  BP: 126/79 (03-28-24 @ 05:20) (110/47 - 126/79)  RR: 17 (03-28-24 @ 05:20) (16 - 18)  SpO2: 100% (03-28-24 @ 05:20) (100% - 100%)  Wt(kg): --  I&O's Summary    26 Mar 2024 07:01  -  27 Mar 2024 07:00  --------------------------------------------------------  IN: 1810 mL / OUT: 1650 mL / NET: 160 mL    27 Mar 2024 07:01  -  28 Mar 2024 06:29  --------------------------------------------------------  IN: 1775 mL / OUT: 2250 mL / NET: -475 mL      CAPILLARY BLOOD GLUCOSE          MEDICATIONS  (STANDING):  acetaminophen     Tablet .. 975 milliGRAM(s) Oral every 6 hours  cefTRIAXone   IVPB 1000 milliGRAM(s) IV Intermittent every 24 hours  doxycycline monohydrate Capsule 100 milliGRAM(s) Oral every 12 hours  famotidine    Tablet 20 milliGRAM(s) Oral two times a day  heparin   Injectable 5000 Unit(s) SubCutaneous every 12 hours  ibuprofen  Tablet. 600 milliGRAM(s) Oral every 6 hours  metroNIDAZOLE    Tablet 500 milliGRAM(s) Oral every 12 hours  senna 2 Tablet(s) Oral at bedtime  simethicone 80 milliGRAM(s) Chew every 6 hours    MEDICATIONS  (PRN):  ondansetron Injectable 4 milliGRAM(s) IV Push every 6 hours PRN Nausea and/or Vomiting      Physical Exam:  Constitutional: NAD, Lying in bed   CV: Regular rate and rhythm. No murmurs appreciated   Lungs: Clear to auscultation bilaterally. No respiratory distress   Abdomen: Soft. Non-tender. Non-distended. Bowel sounds present   : Voiding spontaneously   Extremities: No calf tenderness bilaterally     LABS:  03-27    137    |  104    |  7      ----------------------------<  92     3.7     |  22     |  0.83     Ca    9.1        27 Mar 2024 05:15  Phos  3.0       03-27  Mg     1.60      03-27            Urinalysis Basic - ( 27 Mar 2024 05:15 )    Color: x / Appearance: x / SG: x / pH: x  Gluc: 92 mg/dL / Ketone: x  / Bili: x / Urobili: x   Blood: x / Protein: x / Nitrite: x   Leuk Esterase: x / RBC: x / WBC x   Sq Epi: x / Non Sq Epi: x / Bacteria: x

## 2024-03-28 NOTE — DISCHARGE NOTE NURSING/CASE MANAGEMENT/SOCIAL WORK - PATIENT PORTAL LINK FT
You can access the FollowMyHealth Patient Portal offered by [facility] by registering at the following website: [email or web address]. By joining [facility] FollowMyHealth portal, you will also be able to view your health information using other applications (apps) compatible with our system.

## 2024-04-01 NOTE — CHART NOTE - NSCHARTNOTEFT_GEN_A_CORE
Called patient to follow up and inform Syphilis was negative and ensure patient was able to make a follow up appointment. All recorded numbers in patient info tried. Home number patient did not answer and voicemail left. Alternate number was disconnected.     Siomara Soto, PGY1

## 2025-06-12 NOTE — ED PROVIDER NOTE - DISPOSITION TYPE
Josh called requesting a refill of the below medication which has been pended for you:     Requested Prescriptions     Pending Prescriptions Disp Refills    pantoprazole (PROTONIX) 20 MG tablet 90 tablet 1     Sig: Take 1 tablet by mouth every morning (before breakfast)       Last Appointment Date: 8/28/2024  Next Appointment Date: Visit date not found    No Known Allergies   DISCHARGE